# Patient Record
Sex: FEMALE | Race: WHITE | Employment: OTHER | URBAN - METROPOLITAN AREA
[De-identification: names, ages, dates, MRNs, and addresses within clinical notes are randomized per-mention and may not be internally consistent; named-entity substitution may affect disease eponyms.]

---

## 2017-11-03 ENCOUNTER — TRANSFERRED RECORDS (OUTPATIENT)
Dept: HEALTH INFORMATION MANAGEMENT | Facility: CLINIC | Age: 67
End: 2017-11-03

## 2018-05-31 ENCOUNTER — TRANSFERRED RECORDS (OUTPATIENT)
Dept: HEALTH INFORMATION MANAGEMENT | Facility: CLINIC | Age: 68
End: 2018-05-31

## 2018-06-21 DIAGNOSIS — C79.31 METASTASIS TO BRAIN (H): Primary | ICD-10-CM

## 2018-06-25 ENCOUNTER — PRE VISIT (OUTPATIENT)
Dept: RADIATION ONCOLOGY | Facility: CLINIC | Age: 68
End: 2018-06-25

## 2018-06-25 NOTE — TELEPHONE ENCOUNTER
"Date: 2018   Age: 67 year old  Ethnicity:     Sex: female  : 1950   Lives In: [facility] [place]      Diagnosis:Poorly Differentiated Squamous Cell Carcinoma     Prior radiation therapy:   Site Treated: at  Facility: at  Dates: at  Dose: at    Site Treated: at  Facility: at  Dates: at  Dose: at    Prior chemotherapy:   See Below      Pain at time of consult, management plan if yes:  Does pt have a living will:  Is Pt Pregnant:  Does Pt have any implanted cardiac devices:    Doctors to \"cc\":  Dr. Silvia Manning, Dr. Clayton Brothers,     RN time with patient:     Pt was educated on Gamma Knife Procedure         Review Since Diagnosis:    ; left mastectomy, breast cancer, on arimidex 18 months and tamoxifen for 5 years, no recurrence.  Right prophylactic simple mastectomy    2017; cough    10/20/17; PET; found bulky mediastinal adenopathy encasing the left mainstem bronchus and central upper and lower lobe bronchi, bilateral supraclavicular axillary adenopathy, cervical adenopathy , bilateral pulmonary nodules-most prominent left upper lobe 2cm    10/25/17; Brain MRI, negative     11/3/17; left upper lobe lung bronchial lavage and brushings, positive for malignant cells, left upper lobe lung biopsy showed poorly differentiated squamous cell carcinoma, negative for BRAF V600 mutation,PDL1 pos at 5%    1 dose Carboplatin/Taxol    17; Once final pathology showed squamous cell changed to Carboplatin/Gemcitabine    After two cycles severe myelosuppression, bleeding gums and vaginal, performance status down    17; CT excellent response     18; did a third cycle of chemo, stopped after due to performance status     4/3/18; CT Chest/Abd/Pelvis, increase in two nodules left upper lobe, stable left hilum and left perihilar central bronchi, increase nu,erous mediastinal nodes, stable scattered right lung nodules,     18; CT Chest, new left upper lobe mass, increase in known left upper " lobe mass, mediastinal lymph nodes enlarged, nodes along aortic arch larger,     5/31/18; saw Dr. Manning, past month more shortness of breath, more fatigued and more coughing, nausea abd discomfort.  Prednisone now 10 mg daily for RA symptoms.  Plan to start Nivolumab     6/8/18; started Nivolumab     6/15/18; Brain MRI, 0.3 cm lesion superior left frontal motor strip                                     0.3 cm lesion left frontal                                     0.2 cm lesion right frontal centrum semiovale                                     0.2 cm right temporal     6/19/18; pt saw Dr. Brothers, pt headaches, nausea, right arm weakness, noted started on dexamethasone, discussed options. Wants pt to see Dr. Perera regarding Gamma knife    Chief Complaint: at consult

## 2018-06-27 ENCOUNTER — OFFICE VISIT (OUTPATIENT)
Dept: RADIATION ONCOLOGY | Facility: CLINIC | Age: 68
End: 2018-06-27
Attending: RADIOLOGY
Payer: MEDICARE

## 2018-06-27 VITALS
BODY MASS INDEX: 30.55 KG/M2 | HEIGHT: 62 IN | SYSTOLIC BLOOD PRESSURE: 122 MMHG | WEIGHT: 166 LBS | DIASTOLIC BLOOD PRESSURE: 49 MMHG | HEART RATE: 70 BPM | OXYGEN SATURATION: 98 % | RESPIRATION RATE: 16 BRPM

## 2018-06-27 DIAGNOSIS — C79.31 METASTASIS TO BRAIN (H): Primary | ICD-10-CM

## 2018-06-27 PROCEDURE — G0463 HOSPITAL OUTPT CLINIC VISIT: HCPCS | Performed by: RADIOLOGY

## 2018-06-27 RX ORDER — DIPHENOXYLATE HCL/ATROPINE 2.5-.025MG
1 TABLET ORAL 4 TIMES DAILY PRN
COMMUNITY
End: 2019-01-09

## 2018-06-27 ASSESSMENT — ENCOUNTER SYMPTOMS
ABDOMINAL PAIN: 1
NAUSEA: 1
SEIZURES: 0
DEPRESSION: 1
FEVER: 0
SHORTNESS OF BREATH: 1
VOMITING: 0
WEIGHT LOSS: 0
WEAKNESS: 1

## 2018-06-27 NOTE — PROGRESS NOTES
"  HPI    Date: 2018   Age: 67 year old  Ethnicity:     Sex: female  : 1950   Lives In: [facility]      Diagnosis:Poorly Differentiated Squamous Cell Carcinoma     Prior radiation therapy:   none    Prior chemotherapy:   See Below      Pain at time of consult, management plan if yes: general aching, no bad pain  Does pt have a living will: pt does not have  Is Pt Pregnant: age 68  Does Pt have any implanted cardiac devices: no cardiac devices    Doctors to \"cc\":  Dr. Silvia Manning, Dr. Clayton Brothers, Dr. Itzel Zuniga-family    RN time with patient: 55 minutes    Pt was educated on Gamma Knife Procedure yes        Review Since Diagnosis:    ; left mastectomy, breast cancer, on arimidex 18 months and tamoxifen for 5 years, no recurrence.  Right prophylactic simple mastectomy    2017; cough    10/20/17; PET; found bulky mediastinal adenopathy encasing the left mainstem bronchus and central upper and lower lobe bronchi, bilateral supraclavicular axillary adenopathy, cervical adenopathy , bilateral pulmonary nodules-most prominent left upper lobe 2cm    10/25/17; Brain MRI, negative     11/3/17; left upper lobe lung bronchial lavage and brushings, positive for malignant cells, left upper lobe lung biopsy showed poorly differentiated squamous cell carcinoma, negative for BRAF V600 mutation,PDL1 pos at 5%    1 dose Carboplatin/Taxol    17; Once final pathology showed squamous cell changed to Carboplatin/Gemcitabine    After two cycles severe myelosuppression, bleeding gums and vaginal, performance status down    17; CT excellent response     18; did a third cycle of chemo, stopped after due to performance status     4/3/18; CT Chest/Abd/Pelvis, increase in two nodules left upper lobe, stable left hilum and left perihilar central bronchi, increase nu,erous mediastinal nodes, stable scattered right lung nodules,     18; CT Chest, new left upper lobe mass, increase in " known left upper lobe mass, mediastinal lymph nodes enlarged, nodes along aortic arch larger,     5/31/18; saw Dr. Manning, past month more shortness of breath, more fatigued and more coughing, nausea abd discomfort.  Prednisone now 10 mg daily for RA symptoms.  Plan to start Nivoluma.  Pt also noted confusion that was progressing    6/8/18; started Nivolumab     6/15/18; Brain MRI, 0.3 cm lesion superior left frontal motor strip                                     0.3 cm lesion left frontal                                     0.2 cm lesion right frontal centrum semiovale                                     0.2 cm right temporal     6/19/18; pt saw Dr. Brothers, pt headaches, nausea, right arm weakness, noted started on dexamethasone, 4 mg bid discussed options. Wants pt to see Dr. Perera regarding Gamma knife    7/6/18; next Nivolumab, going to do every four weeks to start with    Chief Complaint:  Pt notes headaches a lot better than had been, right arm still weak, right leg not bad, doesn't use any walker at home, short term memory issues, still confused off and on pt notes she knows what she wants to say but doesn't know how to say it, confused with looking at the clock            Review of Systems   Constitutional: Positive for malaise/fatigue. Negative for fever and weight loss.   HENT: Negative for hearing loss.    Eyes:        Pt states she shadows off and on   Respiratory: Positive for shortness of breath.    Cardiovascular: Negative for chest pain and leg swelling.   Gastrointestinal: Positive for abdominal pain and nausea. Negative for vomiting.   Genitourinary:        Pt notes she does not urinate very often   Neurological: Positive for weakness. Negative for seizures.   Psychiatric/Behavioral: Positive for depression.

## 2018-06-27 NOTE — MR AVS SNAPSHOT
After Visit Summary   6/27/2018    Sarah No    MRN: [id number]           Patient Information     Date Of Birth          1950        Visit Information        Provider Department      6/27/2018 9:00 AM Alex Perera MD [facility], [place], Radiation Oncology        Today's Diagnoses     Metastasis to brain (H)    -  1       Follow-ups after your visit        Your next 10 appointments already scheduled     Jul 18, 2018  5:30 AM CDT   GAMMA TREATMENT with Alex Perera MD   [facility]    [address] [address]   [phone]            Jul 18, 2018  7:00 AM CDT   MR BRAIN W CONTRAST with UUMR1   [facility], MRI ([facility], [facility])    [address] [id number] [phone]           Take your medicines as usual, unless your doctor tells you not to. Bring a list of your current medicines to your exam (including vitamins, minerals and over-the-counter drugs).  You may or may not receive intravenous (IV) contrast for this exam pending the discretion of the Radiologist.  You do not need to do anything special to prepare.  The MRI machine uses a strong magnet. Please wear clothes without metal (snaps, zippers). A sweatsuit works well, or we may give you a hospital gown.  Please remove any body piercings and hair extensions before you arrive. You will also remove watches, jewelry, hairpins, wallets, dentures, partial dental plates and hearing aids. You may wear contact lenses, and you may be able to wear your rings. We have a safe place to keep your personal items, but it is safer to leave them at home.  **IMPORTANT** THE INSTRUCTIONS BELOW ARE ONLY FOR THOSE PATIENTS WHO HAVE BEEN PRESCRIBED SEDATION OR GENERAL ANESTHESIA DURING THEIR MRI PROCEDURE:  IF YOUR DOCTOR PRESCRIBED ORAL SEDATION (take medicine to help you relax during your exam):   You must get the  medicine from your doctor (oral medication) before you arrive. Bring the medicine to the exam. Do not take it at home. You ll be told when to take it upon arriving for your exam.   Arrive one hour early. Bring someone who can take you home after the test. Your medicine will make you sleepy. After the exam, you may not drive, take a bus or take a taxi by yourself.  IF YOUR DOCTOR PRESCRIBED IV SEDATION:   Arrive one hour early. Bring someone who can take you home after the test. Your medicine will make you sleepy. After the exam, you may not drive, take a bus or take a taxi by yourself.   No eating 6 hours before your exam. You may have clear liquids up until 4 hours before your exam. (Clear liquids include water, clear tea, black coffee and fruit juice without pulp.)  IF YOUR DOCTOR PRESCRIBED ANESTHESIA (be asleep for your exam):   Arrive 1 1/2 hours early. Bring someone who can take you home after the test. You may not drive, take a bus or take a taxi by yourself.   No eating 8 hours before your exam. You may have clear liquids up until 4 hours before your exam. (Clear liquids include water, clear tea, black coffee and fruit juice without pulp.)   You will spend four to five hours in the recovery room.  Please call the Imaging Department at your exam site with any questions.              Who to contact     Please call your clinic at [phone] to:    Ask questions about your health    Make or cancel appointments    Discuss your medicines    Learn about your test results    Speak to your doctor            Additional Information About Your Visit        GREE Information     GREE gives you secure access to your electronic health record. If you see a primary care provider, you can also send messages to your care team and make appointments. If you have questions, please call your primary care clinic.  If you do not have a primary care provider, please call [phone] and they will assist you.      GREE is  "an electronic gateway that provides easy, online access to your medical records. With [employer], you can request a clinic appointment, read your test results, renew a prescription or communicate with your care team.     To access your existing account, please contact your [facility] or call [phone] for assistance.        Care EveryWhere ID     This is your Care EveryWhere ID. This could be used by other organizations to access your [facility] medical records  [id number]        Your Vitals Were     Pulse Respirations Height Pulse Oximetry BMI (Body Mass Index)       70 16 1.575 m (5' 2\") 98% 30.36 kg/m2        Blood Pressure from Last 3 Encounters:   06/27/18 122/49   12/05/12 111/67   11/20/12 114/68    Weight from Last 3 Encounters:   06/27/18 75.3 kg (166 lb)   12/05/12 55.8 kg (123 lb)   11/20/12 56.1 kg (123 lb 10.9 oz)              We Performed the Following     CBC with platelets differential (only if on Chemo)     Creatinine (if diagnosis of AVM, IV contrast CT required, hx of renal problems, or > age 60)     Electrolyte panel (If > age 60 or hx of being on diuretic, digoxin, or hx of seizures)       Information about OPIOIDS     PRESCRIPTION OPIOIDS: WHAT YOU NEED TO KNOW   We gave you an opioid (narcotic) pain medicine. It is important to manage your pain, but opioids are not always the best choice. You should first try all the other options your care team gave you. Take this medicine for as short a time (and as few doses) as possible.     These medicines have risks:    DO NOT drive when on new or higher doses of pain medicine. These medicines can affect your alertness and reaction times, and you could be arrested for driving under the influence (DUI). If you need to use opioids long-term, talk to your care team about driving.    DO NOT operate heave machinery    DO NOT do any other dangerous activities while taking these medicines.     DO NOT drink any alcohol while taking " these medicines.      If the opioid prescribed includes acetaminophen, DO NOT take with any other medicines that contain acetaminophen. Read all labels carefully. Look for the word  acetaminophen  or  Tylenol.  Ask your pharmacist if you have questions or are unsure.    You can get addicted to pain medicines, especially if you have a history of addiction (chemical, alcohol or substance dependence). Talk to your care team about ways to reduce this risk.    Store your pills in a secure place, locked if possible. We will not replace any lost or stolen medicine. If you don t finish your medicine, please throw away (dispose) as directed by your pharmacist. The [place] Pollution Control Agency has more information about safe disposal: https://www.pca.[facility].[place].us/living-green/managing-unwanted-medications.     All opioids tend to cause constipation. Drink plenty of water and eat foods that have a lot of fiber, such as fruits, vegetables, prune juice, apple juice and high-fiber cereal. Take a laxative (Miralax, milk of magnesia, Colace, Senna) if you don t move your bowels at least every other day.          Primary Care Provider Fax #    Physician No Ref-Primary [phone]       No address on file        Equal Access to Services     CANELO JOHNSON : Hadyasemin Richey, wamendelda isidra, qaybta kaalmada josé miguel, marco atkinson . So [facility] [phone].    ATENCIÓN: Si habla español, tiene a león disposición servicios gratuitos de asistencia lingüística. Llame al [phone].    We comply with applicable federal civil rights laws and [place] laws. We do not discriminate on the basis of race, color, national origin, age, disability, sex, sexual orientation, or gender identity.            Thank you!     Thank you for choosing [facility], RADIATION ONCOLOGY  for your care. Our goal is always to provide you with excellent care. Hearing back from our patients is one way we can continue to  improve our services. Please take a few minutes to complete the written survey that you may receive in the mail after your visit with us. Thank you!             Your Updated Medication List - Protect others around you: Learn how to safely use, store and throw away your medicines at www.disposemymeds.org.          This list is accurate as of 6/27/18 11:59 PM.  Always use your most recent med list.                   Brand Name Dispense Instructions for use Diagnosis    ALPRAZolam 0.5 MG tablet    XANAX     Take 1-3 tablets by mouth daily as needed.        CYCLOBENZAPRINE HCL PO      Take 5 mg by mouth 2 times daily        DEXAMETHASONE PO      Take 4 mg by mouth 2 times daily (with meals)        diphenoxylate-atropine 2.5-0.025 MG per tablet    LOMOTIL     Take 1 tablet by mouth 4 times daily as needed for diarrhea        FOLIC ACID PO      Take 1 mg by mouth daily        GABAPENTIN PO      Take 800 mg by mouth 2 times daily        HYDROXYZINE HCL PO      Take 25 mg by mouth every 6 hours as needed for itching        LISINOPRIL PO      Take 5 mg by mouth daily        LORAZEPAM PO      Take 0.5 mg by mouth every 4 hours as needed for anxiety        METOPROLOL TARTRATE PO      Take 12.5 mg by mouth 2 times daily        mometasone-formoterol 100-5 MCG/ACT oral inhaler    DULERA     Inhale 2 puffs into the lungs 2 times daily        ondansetron 4 MG ODT tab    ZOFRAN-ODT    45 tablet    Take 1 tablet by mouth every 6 hours as needed for nausea.    Biliary anomaly       PILOCARPINE HCL PO      Take 5 mg by mouth 3 times daily        PREDNISONE PO      Take 10 mg by mouth daily        PRILOSEC OTC PO           TRAMADOL HCL PO      Take 50 mg by mouth every 6 hours as needed for moderate to severe pain        TRAZODONE HCL PO      Take 50 mg by mouth At Bedtime        ZOFRAN PO      Take 8 mg by mouth every 6 hours as needed for nausea or vomiting

## 2018-06-27 NOTE — PROGRESS NOTES
Service Date: 06/27/2018      DIAGNOSES:  Metastatic nonsmall cell lung cancer (squamous cell carcinoma) with multiple brain metastases in a patient with a history of breast cancer.      HISTORY OF PRESENT ILLNESS:  The patient is a 67-year-old white female with a 40-pack-year smoking history who was in stable health until 09/2017 when she developed a cough.  Chest x-ray, chest CT and subsequent PET scan 10/2017 confirmed extensive mediastinal adenopathy with bilateral supraclavicular and cervical adenopathy and bilateral pulmonary nodules.  Brain MRI 10/25/2017 showed no brain metastases.  Bronchoscopic biopsy 11/03/2017 confirmed nonsmall cell lung cancer (poorly differentiated squamous cell carcinoma).  She received 3 cycles of chemotherapy, which was poorly tolerated, but did produce a good response to treatment on a CT scan of 12/26/2017.  Followup CT 04/03/2018 showed a mixed response with some lesions in the lungs, stable but others enlarging.        In early May, she became short of breath and a chest CT 05/22/2018 showed a new left upper lung mass and enlargement of the previously seen lung lesions.  In late May, she developed confusion, mild headaches and fatigue.  On 06/08/2018, she started nivolumab immunotherapy on a once a month schedule.  Brain MRI 06/15/2018 showed 4 very small brain metastases (0.3 cm left superior frontal, 0.3 cm mid left frontal, 0.2 cm right medial frontal and 0.2 cm right temporal).  She was started on dexamethasone and referred for consideration of Gamma Knife radiosurgery.        The patient reports she feels worse since the dexamethasone was started and still has side effects from the nivolumab including diarrhea.  She complains of her arthritis, which she feels is worse, but her headaches are slightly better.      PAST MEDICAL HISTORY:   1.  Hypertension.   2.  Depression.   3.  Anxiety.   4.  Gastroesophageal reflux disease.   5.  Tobacco abuse.   6.  Chronic pain.   7.   History of breast cancer, status post bilateral mastectomies in 2010 and treated with Arimidex and tamoxifen for 5 years.   8.  Status post cholecystectomy.     9.  History of rheumatoid arthritis.   10.  History of Sjogren syndrome.   11.  Status post appendectomy.      MEDICATIONS:   1.  Dexamethasone 4 mg b.i.d.  (I will taper this off over the next few weeks).     2.  Alprazolam.   3.  Cyclobenzaprine.   4.  Lomotil.   5.  Folic Acid.   6.  Gabapentin.   7.  Hydroxyzine.   8.  Lisinopril.   9.  Lorazepam.   10.  Metoprolol.   11.  Omeprazole.   12.  Zofran.   13.  Pilocarpine.   14.  Prednisone 10 mg daily for rheumatoid arthritis.   15.  Tramadol.      ALLERGIES:  Zantac.      REVIEW OF SYSTEMS:  All systems were reviewed and found to be otherwise negative.      HABITS:  The patient smoked 1 pack of cigarettes per day for 40 years for a 40-pack-year smoking history.  She quit smoking in 2012.  She seldom drinks alcohol.      SOCIAL HISTORY:  The patient is  and has 4 children.  She lives in [place], [place].  She is accompanied to today's appointment by her , her daughter and her sister.  She is retired from a career as a .      FAMILY HISTORY:  Her mother had lung cancer.  Her father had alcoholism.  Her maternal grandmother had breast cancer and colorectal cancer.  Her brother had prostate and bladder cancer.      PHYSICAL EXAMINATION:   GENERAL:  Well-developed, well-nourished white female in no acute distress, alert and oriented, pleasant and cooperative.   VITAL SIGNS:  Afebrile, vital signs stable.  Height 5 feet 2 inches, weight 166, blood pressure 122/49, heart rate 70, respirations 12, oxygen saturation on room air 98%.  BMI 30.4.   HEENT:  Normal for age.   NECK:  Supple without adenopathy.   LUNGS:  Reveal scattered rhonchi bilaterally.   HEART:  Regular rate and rhythm.  She is status post bilateral mastectomies.   ABDOMEN:  Soft, nontender, without hepatosplenomegaly  or masses.   EXTREMITIES:  Without cyanosis, clubbing or edema.   NEUROLOGIC:  Nonfocal.      IMPRESSION:  Metastatic nonsmall cell lung cancer (squamous cell carcinoma) with 4 small brain metastases.      RECOMMENDATIONS:  The diagnosis, prognosis and therapeutic options were reviewed with the patient and her family.  The brain MRI images were reviewed with them and the tumors were pointed out.  The rationale for Gamma Knife radiosurgery was explained as were the procedures, risks, benefits and potential side effects.  The patient and her family appear to understand and agreed to proceed.  I will also taper the dexamethasone as it does not appear necessary and may hinder the effect of the nivolumab immunotherapy.        The Gamma Knife radiosurgery is scheduled for 2018 at the [facility] at the [facility].  Her next nivolumab scheduled for 2018 with Dr. Manning.      Thank you very much for asking me to see this pleasant woman and to share in her evaluation.      cc:      MD Itzel Dangelo MD D Ross Dickson, MD    [facility] Radiation Therapy   [address] [place] [address]      Tumor Registry         GIL CRUZ MD             D: 2018   T: 2018   MT: [place]      Name:     ANETTE SHAW   MRN:      [id number]        Account:      [id number]   :      1950           Service Date: 2018      Document: [id number]

## 2018-06-27 NOTE — LETTER
"2018       RE: Sarah No  [address] [facility] [address] [place] [address]     Dear Colleague,    Thank you for referring your patient, Sarah No, to the [facility], [place], RADIATION ONCOLOGY. Please see a copy of my visit note below.      HPI    Date: 2018   Age: 67 year old  Ethnicity:     Sex: female  : 1950   L[place] In: [place]      Diagnosis:Poorly Differentiated Squamous Cell Carcinoma     Prior radiation therapy:   none    Prior chemotherapy:   See Below      Pain at time of consult, management plan if yes: general aching, no bad pain  Does pt have a living will: pt does not have  Is Pt Pregnant: age 68  Does Pt have any implanted cardiac devices: no cardiac devices    Doctors to \"cc\":  Dr. Silvia Manning, Dr. Clayton Brothers, Dr. Itzel Zuniga-family    RN time with patient: 55 minutes    Pt was educated on Gamma Knife Procedure yes        Review Since Diagnosis:    ; left mastectomy, breast cancer, on arimidex 18 months and tamoxifen for 5 years, no recurrence.  Right prophylactic simple mastectomy    2017; cough    10/20/17; PET; found bulky mediastinal adenopathy encasing the left mainstem bronchus and central upper and lower lobe bronchi, bilateral supraclavicular axillary adenopathy, cervical adenopathy , bilateral pulmonary nodules-most prominent left upper lobe 2cm    10/25/17; Brain MRI, negative     11/3/17; left upper lobe lung bronchial lavage and brushings, positive for malignant cells, left upper lobe lung biopsy showed poorly differentiated squamous cell carcinoma, negative for BRAF V600 mutation,PDL1 pos at 5%    1 dose Carboplatin/Taxol    17; Once final pathology showed squamous cell changed to Carboplatin/Gemcitabine    After two cycles severe myelosuppression, bleeding gums and vaginal, performance status down    17; CT excellent response     18; did a third cycle of chemo, stopped after due to performance status     4/3/18; " CT Chest/Abd/Pelvis, increase in two nodules left upper lobe, stable left hilum and left perihilar central bronchi, increase nu,erous mediastinal nodes, stable scattered right lung nodules,     5/22/18; CT Chest, new left upper lobe mass, increase in known left upper lobe mass, mediastinal lymph nodes enlarged, nodes along aortic arch larger,     5/31/18; saw Dr. Manning, past month more shortness of breath, more fatigued and more coughing, nausea abd discomfort.  Prednisone now 10 mg daily for RA symptoms.  Plan to start Nivoluma.  Pt also noted confusion that was progressing    6/8/18; started Nivolumab     6/15/18; Brain MRI, 0.3 cm lesion superior left frontal motor strip                                     0.3 cm lesion left frontal                                     0.2 cm lesion right frontal centrum semiovale                                     0.2 cm right temporal     6/19/18; pt saw Dr. Brothers, pt headaches, nausea, right arm weakness, noted started on dexamethasone, 4 mg bid discussed options. Wants pt to see Dr. Perera regarding Gamma knife    7/6/18; next Nivolumab, going to do every four weeks to start with    Chief Complaint:  Pt notes headaches a lot better than had been, right arm still weak, right leg not bad, doesn't use any walker at home, short term memory issues, still confused off and on pt notes she knows what she wants to say but doesn't know how to say it, confused with looking at the clock            Review of Systems   Constitutional: Positive for malaise/fatigue. Negative for fever and weight loss.   HENT: Negative for hearing loss.    Eyes:        Pt states she shadows off and on   Respiratory: Positive for shortness of breath.    Cardiovascular: Negative for chest pain and leg swelling.   Gastrointestinal: Positive for abdominal pain and nausea. Negative for vomiting.   Genitourinary:        Pt notes she does not urinate very often   Neurological: Positive for weakness. Negative  for seizures.   Psychiatric/Behavioral: Positive for depression.                 Service Date: 06/27/2018      DIAGNOSES:  Metastatic nonsmall cell lung cancer (squamous cell carcinoma) with multiple brain metastases in a patient with a history of breast cancer.      HISTORY OF PRESENT ILLNESS:  The patient is a 67-year-old white female with a 40-pack-year smoking history who was in stable health until 09/2017 when she developed a cough.  Chest x-ray, chest CT and subsequent PET scan 10/2017 confirmed extensive mediastinal adenopathy with bilateral supraclavicular and cervical adenopathy and bilateral pulmonary nodules.  Brain MRI 10/25/2017 showed no brain metastases.  Bronchoscopic biopsy 11/03/2017 confirmed nonsmall cell lung cancer (poorly differentiated squamous cell carcinoma).  She received 3 cycles of chemotherapy, which was poorly tolerated, but did produce a good response to treatment on a CT scan of 12/26/2017.  Followup CT 04/03/2018 showed a mixed response with some lesions in the lungs, stable but others enlarging.        In early May, she became short of breath and a chest CT 05/22/2018 showed a new left upper lung mass and enlargement of the previously seen lung lesions.  In late May, she developed confusion, mild headaches and fatigue.  On 06/08/2018, she started nivolumab immunotherapy on a once a month schedule.  Brain MRI 06/15/2018 showed 4 very small brain metastases (0.3 cm left superior frontal, 0.3 cm mid left frontal, 0.2 cm right medial frontal and 0.2 cm right temporal).  She was started on dexamethasone and referred for consideration of Gamma Knife radiosurgery.        The patient reports she feels worse since the dexamethasone was started and still has side effects from the nivolumab including diarrhea.  She complains of her arthritis, which she feels is worse, but her headaches are slightly better.      PAST MEDICAL HISTORY:   1.  Hypertension.   2.  Depression.   3.  Anxiety.   4.   Gastroesophageal reflux disease.   5.  Tobacco abuse.   6.  Chronic pain.   7.  History of breast cancer, status post bilateral mastectomies in 2010 and treated with Arimidex and tamoxifen for 5 years.   8.  Status post cholecystectomy.     9.  History of rheumatoid arthritis.   10.  History of Sjogren syndrome.   11.  Status post appendectomy.      MEDICATIONS:   1.  Dexamethasone 4 mg b.i.d.  (I will taper this off over the next few weeks).     2.  Alprazolam.   3.  Cyclobenzaprine.   4.  Lomotil.   5.  Folic Acid.   6.  Gabapentin.   7.  Hydroxyzine.   8.  Lisinopril.   9.  Lorazepam.   10.  Metoprolol.   11.  Omeprazole.   12.  Zofran.   13.  Pilocarpine.   14.  Prednisone 10 mg daily for rheumatoid arthritis.   15.  Tramadol.      ALLERGIES:  Zantac.      REVIEW OF SYSTEMS:  All systems were reviewed and found to be otherwise negative.      HABITS:  The patient smoked 1 pack of cigarettes per day for 40 years for a 40-pack-year smoking history.  She quit smoking in 2012.  She seldom drinks alcohol.      SOCIAL HISTORY:  The patient is  and has 4 children.  She lives in [place], [place].  She is accompanied to today's appointment by her , her daughter and her sister.  She is retired from a career as a .      FAMILY HISTORY:  Her mother had lung cancer.  Her father had alcoholism.  Her maternal grandmother had breast cancer and colorectal cancer.  Her brother had prostate and bladder cancer.      PHYSICAL EXAMINATION:   GENERAL:  Well-developed, well-nourished white female in no acute distress, alert and oriented, pleasant and cooperative.   VITAL SIGNS:  Afebrile, vital signs stable.  Height 5 feet 2 inches, weight 166, blood pressure 122/49, heart rate 70, respirations 12, oxygen saturation on room air 98%.  BMI 30.4.   HEENT:  Normal for age.   NECK:  Supple without adenopathy.   LUNGS:  Reveal scattered rhonchi bilaterally.   HEART:  Regular rate and rhythm.  She is status post  bilateral mastectomies.   ABDOMEN:  Soft, nontender, without hepatosplenomegaly or masses.   EXTREMITIES:  Without cyanosis, clubbing or edema.   NEUROLOGIC:  Nonfocal.      IMPRESSION:  Metastatic nonsmall cell lung cancer (squamous cell carcinoma) with 4 small brain metastases.      RECOMMENDATIONS:  The diagnosis, prognosis and therapeutic options were reviewed with the patient and her family.  The brain MRI images were reviewed with them and the tumors were pointed out.  The rationale for Gamma Knife radiosurgery was explained as were the procedures, risks, benefits and potential side effects.  The patient and her family appear to understand and agreed to proceed.  I will also taper the dexamethasone as it does not appear necessary and may hinder the effect of the nivolumab immunotherapy.        The Gamma Knife radiosurgery is scheduled for 2018 at the [facility] Kn[facility] at the [facility].  Her next nivolumab scheduled for 2018 with Dr. Manning.      Thank you very much for asking me to see this pleasant woman and to share in her evaluation.         GIL CRUZ MD      cc:      MD Itzel Dangelo MD D Ross Dickson, MD    [facility]   [address] [place] [address]      Tumor Registry                 D: 2018   T: 2018   MT: [place]      Name:     ANETTE SHAW   MRN:      [id number]        Account:      [id number]   :      1950           Service Date: 2018      Document: [id number]

## 2018-07-03 NOTE — PATIENT INSTRUCTIONS
DECADRON SCHEDULE    Patient Name: Sarah No    Decadron (Dexamethasone) is a drug, a type of steroid, which is often used in patients with brain tumors to reduce swelling in the brain.  It must be taken carefully and cannot be stopped abruptly.  The dose must be gradually reduced or tapered.  The table below provides exact instructions regarding how to take it.      Day Date Total Daily Dose (mg) Number of 4 mg tablets to take at      Breakfast Lunch Supper Bedtime    Thru 6/26/18 8 1 0 1 0   W-Sun 6/27/18 thru 7/1/18 6 1 0 1/2 0   M-Sun 7/2/18 thru 7/8/18 4 1 0 0 0   M-Sun 7/9/18 thru 7/15/18 2 1/2 0 0 0   M 7/16/18 0                                                                                          Other Instructions: Opdivo 7/6/18                                Gamma Knife 7/18/18      Dr. CRUZ, GIL MILES

## 2018-07-15 ENCOUNTER — TRANSFERRED RECORDS (OUTPATIENT)
Dept: HEALTH INFORMATION MANAGEMENT | Facility: CLINIC | Age: 68
End: 2018-07-15

## 2018-07-16 ENCOUNTER — TRANSFERRED RECORDS (OUTPATIENT)
Dept: HEALTH INFORMATION MANAGEMENT | Facility: CLINIC | Age: 68
End: 2018-07-16

## 2018-07-16 LAB
BASOPHILS NFR BLD AUTO: NORMAL %
CREAT SERPL-MCNC: 1 MG/DL
EOSINOPHIL NFR BLD AUTO: NORMAL %
ERYTHROCYTE [DISTWIDTH] IN BLOOD BY AUTOMATED COUNT: NORMAL %
GFR SERPL CREATININE-BSD FRML MDRD: 57.4 ML/MIN/1.73M2
HCT VFR BLD AUTO: NORMAL %
HEMOGLOBIN: 13.7 G/DL (ref 11.7–15.7)
LYMPHOCYTES NFR BLD AUTO: NORMAL %
MCH RBC QN AUTO: NORMAL PG
MCHC RBC AUTO-ENTMCNC: NORMAL G/DL
MCV RBC AUTO: NORMAL FL
MONOCYTES NFR BLD AUTO: NORMAL %
NEUTROPHILS NFR BLD AUTO: 87.7 %
PLATELET COUNT - QUEST: 166 10^9/L (ref 150–450)
RBC # BLD AUTO: NORMAL 10^12/L
WBC # BLD AUTO: 11.3 10^9/L

## 2018-07-18 ENCOUNTER — OFFICE VISIT (OUTPATIENT)
Dept: RADIATION ONCOLOGY | Facility: CLINIC | Age: 68
End: 2018-07-18
Attending: RADIOLOGY
Payer: MEDICARE

## 2018-07-18 ENCOUNTER — HOSPITAL ENCOUNTER (OUTPATIENT)
Dept: MRI IMAGING | Facility: CLINIC | Age: 68
Discharge: HOME | End: 2018-07-18
Attending: NEUROLOGICAL SURGERY | Admitting: NEUROLOGICAL SURGERY
Payer: MEDICARE

## 2018-07-18 ENCOUNTER — APPOINTMENT (OUTPATIENT)
Dept: MEDSURG UNIT | Facility: CLINIC | Age: 68
End: 2018-07-18
Attending: RADIOLOGY
Payer: MEDICARE

## 2018-07-18 VITALS
HEART RATE: 81 BPM | RESPIRATION RATE: 18 BRPM | DIASTOLIC BLOOD PRESSURE: 70 MMHG | SYSTOLIC BLOOD PRESSURE: 123 MMHG | TEMPERATURE: 97.8 F | OXYGEN SATURATION: 97 %

## 2018-07-18 VITALS
OXYGEN SATURATION: 94 % | WEIGHT: 160 LBS | BODY MASS INDEX: 29.26 KG/M2 | DIASTOLIC BLOOD PRESSURE: 51 MMHG | SYSTOLIC BLOOD PRESSURE: 131 MMHG | RESPIRATION RATE: 16 BRPM | HEART RATE: 79 BPM

## 2018-07-18 DIAGNOSIS — C79.31 METASTASIS TO BRAIN (H): Primary | ICD-10-CM

## 2018-07-18 DIAGNOSIS — C79.31 METASTASIS TO BRAIN (H): ICD-10-CM

## 2018-07-18 PROCEDURE — 77300 RADIATION THERAPY DOSE PLAN: CPT | Performed by: RADIOLOGY

## 2018-07-18 PROCEDURE — 77371 SRS MULTISOURCE: CPT | Performed by: RADIOLOGY

## 2018-07-18 PROCEDURE — 25000125 ZZHC RX 250: Mod: ZF | Performed by: NEUROLOGICAL SURGERY

## 2018-07-18 PROCEDURE — 77370 RADIATION PHYSICS CONSULT: CPT | Performed by: RADIOLOGY

## 2018-07-18 PROCEDURE — A9585 GADOBUTROL INJECTION: HCPCS | Performed by: NEUROLOGICAL SURGERY

## 2018-07-18 PROCEDURE — 70552 MRI BRAIN STEM W/DYE: CPT

## 2018-07-18 PROCEDURE — 40000172 ZZH STATISTIC PROCEDURE PREP ONLY

## 2018-07-18 PROCEDURE — 77470 SPECIAL RADIATION TREATMENT: CPT | Performed by: RADIOLOGY

## 2018-07-18 PROCEDURE — 25000128 H RX IP 250 OP 636: Mod: ZF | Performed by: NEUROLOGICAL SURGERY

## 2018-07-18 PROCEDURE — A9270 NON-COVERED ITEM OR SERVICE: HCPCS | Mod: GY | Performed by: NEUROLOGICAL SURGERY

## 2018-07-18 PROCEDURE — 77295 3-D RADIOTHERAPY PLAN: CPT | Performed by: RADIOLOGY

## 2018-07-18 PROCEDURE — 25000132 ZZH RX MED GY IP 250 OP 250 PS 637: Mod: GY | Performed by: NEUROLOGICAL SURGERY

## 2018-07-18 PROCEDURE — 25000128 H RX IP 250 OP 636: Performed by: NEUROLOGICAL SURGERY

## 2018-07-18 PROCEDURE — 77334 RADIATION TREATMENT AID(S): CPT | Performed by: RADIOLOGY

## 2018-07-18 RX ORDER — GADOBUTROL 604.72 MG/ML
7.5 INJECTION INTRAVENOUS ONCE
Status: COMPLETED | OUTPATIENT
Start: 2018-07-18 | End: 2018-07-18

## 2018-07-18 RX ORDER — LORAZEPAM 0.5 MG/1
.5-2 TABLET ORAL
Status: COMPLETED | OUTPATIENT
Start: 2018-07-18 | End: 2018-07-18

## 2018-07-18 RX ORDER — LORAZEPAM 0.5 MG/1
.5-1 TABLET ORAL
Status: DISCONTINUED | OUTPATIENT
Start: 2018-07-18 | End: 2018-07-18 | Stop reason: HOSPADM

## 2018-07-18 RX ORDER — HEPARIN SODIUM (PORCINE) LOCK FLUSH IV SOLN 100 UNIT/ML 100 UNIT/ML
500 SOLUTION INTRAVENOUS SEE ADMIN INSTRUCTIONS
Status: COMPLETED | OUTPATIENT
Start: 2018-07-18 | End: 2018-07-18

## 2018-07-18 RX ORDER — LIDOCAINE 40 MG/G
1 CREAM TOPICAL SEE ADMIN INSTRUCTIONS
Status: COMPLETED | OUTPATIENT
Start: 2018-07-18 | End: 2018-07-18

## 2018-07-18 RX ORDER — HYDROCODONE BITARTRATE AND ACETAMINOPHEN 5; 325 MG/1; MG/1
1-2 TABLET ORAL EVERY 6 HOURS PRN
Status: DISCONTINUED | OUTPATIENT
Start: 2018-07-18 | End: 2018-07-18 | Stop reason: HOSPADM

## 2018-07-18 RX ORDER — HEPARIN SODIUM,PORCINE 10 UNIT/ML
5 VIAL (ML) INTRAVENOUS
Status: DISCONTINUED | OUTPATIENT
Start: 2018-07-18 | End: 2018-07-18 | Stop reason: HOSPADM

## 2018-07-18 RX ORDER — DEXAMETHASONE 4 MG/1
4 TABLET ORAL
Status: DISCONTINUED | OUTPATIENT
Start: 2018-07-18 | End: 2018-07-18 | Stop reason: HOSPADM

## 2018-07-18 RX ORDER — ACETAMINOPHEN 325 MG/1
650 TABLET ORAL EVERY 4 HOURS PRN
Status: DISCONTINUED | OUTPATIENT
Start: 2018-07-18 | End: 2018-07-18 | Stop reason: HOSPADM

## 2018-07-18 RX ORDER — ONDANSETRON 4 MG/1
8 TABLET, ORALLY DISINTEGRATING ORAL EVERY 6 HOURS PRN
Status: DISCONTINUED | OUTPATIENT
Start: 2018-07-18 | End: 2018-07-18 | Stop reason: HOSPADM

## 2018-07-18 RX ORDER — ONDANSETRON 2 MG/ML
4 INJECTION INTRAMUSCULAR; INTRAVENOUS
Status: DISCONTINUED | OUTPATIENT
Start: 2018-07-18 | End: 2018-07-18 | Stop reason: HOSPADM

## 2018-07-18 RX ADMIN — SODIUM CHLORIDE, PRESERVATIVE FREE 5 ML: 5 INJECTION INTRAVENOUS at 07:11

## 2018-07-18 RX ADMIN — LORAZEPAM 2 MG: 0.5 TABLET ORAL at 06:15

## 2018-07-18 RX ADMIN — BUPIVACAINE HYDROCHLORIDE 30 ML: 7.5 INJECTION, SOLUTION EPIDURAL; RETROBULBAR at 05:54

## 2018-07-18 RX ADMIN — LIDOCAINE 1 G: 40 CREAM TOPICAL at 05:54

## 2018-07-18 RX ADMIN — SODIUM CHLORIDE, PRESERVATIVE FREE 500 UNITS: 5 INJECTION INTRAVENOUS at 11:20

## 2018-07-18 RX ADMIN — GADOBUTROL 7.5 ML: 604.72 INJECTION INTRAVENOUS at 07:17

## 2018-07-18 NOTE — MR AVS SNAPSHOT
After Visit Summary   7/18/2018    Sarah No    MRN: [id number]           Patient Information     Date Of Birth          1950        Visit Information        Provider Department      7/18/2018 5:30 AM Alex Perera MD [facility], [place], Radiation Oncology        Today's Diagnoses     Metastasis to brain (H)    -  1       Follow-ups after your visit        Future tests that were ordered for you today     Open Standing Orders        Priority Remaining Interval Expires Ordered    If Patient Diabetic: Glucose monitor nursing POCT Routine [id number] PRN 7/19/2018 7/18/2018    Oxygen: Nasal cannula Routine [id number] PRN  7/18/2018            Who to contact     Please call your clinic at [phone] to:    Ask questions about your health    Make or cancel appointments    Discuss your medicines    Learn about your test results    Speak to your doctor            Additional Information About Your Visit        MyChart Information     [employer] gives you secure access to your electronic health record. If you see a primary care provider, you can also send messages to your care team and make appointments. If you have questions, please call your primary care clinic.  If you do not have a primary care provider, please call [phone] and they will assist you.      [employer] is an electronic gateway that provides easy, online access to your medical records. With [employer], you can request a clinic appointment, read your test results, renew a prescription or communicate with your care team.     To access your existing account, please contact your [facility] or call [phone] for assistance.        Care EveryWhere ID     This is your Care EveryWhere ID. This could be used by other organizations to access your [place] medical records  [id number]        Your Vitals Were     Pulse Respirations Pulse Oximetry BMI (Body Mass Index)          90 16 97% 29.26 kg/m2          Blood Pressure from Last 3 Encounters:   07/18/18 123/70   07/18/18 153/59   06/27/18 122/49    Weight from Last 3 Encounters:   07/18/18 72.6 kg (160 lb)   06/27/18 75.3 kg (166 lb)   12/05/12 55.8 kg (123 lb)              Today, you had the following     No orders found for display       Primary Care Provider Fax #    Physician No Ref-Primary [phone]       No address on file        Equal Access to Services     CANELO JOHNSON : Hadii aad ku hadasho Soomaali, waaxda luqadaha, qaybta kaalmada adeegyada, waxay aliasin jonathan atkinson . So [facility] [phone].    ATENCIÓN: Si habla español, tiene a león disposición servicios gratuitos de asistencia lingüística. [facility] [phone].    We comply with applicable federal civil rights laws and [place] laws. We do not discriminate on the basis of race, color, national origin, age, disability, sex, sexual orientation, or gender identity.            Thank you!     Thank you for choosing [facility], [place], RADIATION ONCOLOGY  for your care. Our goal is always to provide you with excellent care. Hearing back from our patients is one way we can continue to improve our services. Please take a few minutes to complete the written survey that you may receive in the mail after your visit with us. Thank you!             Your Updated Medication List - Protect others around you: Learn how to safely use, store and throw away your medicines at www.disposemymeds.org.          This list is accurate as of 7/18/18  9:51 AM.  Always use your most recent med list.                   Brand Name Dispense Instructions for use Diagnosis    CYCLOBENZAPRINE HCL PO      Take 5 mg by mouth 2 times daily        DEXAMETHASONE PO      Take 4 mg by mouth 2 times daily (with meals)        diphenoxylate-atropine 2.5-0.025 MG per tablet    LOMOTIL     Take 1 tablet by mouth 4 times daily as needed for diarrhea        FOLIC ACID PO      Take 1 mg by mouth daily        GABAPENTIN PO      Take 800 mg  by mouth 2 times daily        HYDROXYZINE HCL PO      Take 25 mg by mouth every 6 hours as needed for itching        LISINOPRIL PO      Take 5 mg by mouth daily        LORAZEPAM PO      Take 0.5 mg by mouth every 4 hours as needed for anxiety        METOPROLOL TARTRATE PO      Take 12.5 mg by mouth 2 times daily        mometasone-formoterol 100-5 MCG/ACT oral inhaler    DULERA     Inhale 2 puffs into the lungs 2 times daily        PILOCARPINE HCL PO      Take 5 mg by mouth 3 times daily        PREDNISONE PO      Take 10 mg by mouth daily        PRILOSEC OTC PO           TRAMADOL HCL PO      Take 50 mg by mouth every 6 hours as needed for moderate to severe pain        TRAZODONE HCL PO      Take 50 mg by mouth At Bedtime        ZOFRAN PO      Take 8 mg by mouth every 6 hours as needed for nausea or vomiting

## 2018-07-18 NOTE — PROGRESS NOTES
GAMMA KNIFE RADIOSURGERY TREATMENT SUMMARY  DEPARTMENT OF RADIATION ONCOLOGY    Name: Sarah No                                        : 1950                 Medical Record #: [id number]                       Diagnosis:   Non Small Cell Lung Cancer                                  Date of Treatment: 2018                                       Referring Physicians:  Silvia Manning, FÁTIMA [facility], Tumor Registry     BRIEF CLINICAL HISTORY:                                                                                                 The patient is a 67-year-old white female with a 40-pack-year smoking history who was in stable health until 2017 when she developed a cough.  Chest x-ray, chest CT and subsequent PET scan 10/2017 confirmed extensive mediastinal adenopathy with bilateral supraclavicular and cervical adenopathy and bilateral pulmonary nodules.  Brain MRI 10/25/2017 showed no brain metastases.  Bronchoscopic biopsy 2017 confirmed nonsmall cell lung cancer (poorly differentiated squamous cell carcinoma).  She received 3 cycles of chemotherapy, which was poorly tolerated, but did produce a good response to treatment on a CT scan of 2017.  Followup CT 2018 showed a mixed response with some lesions in the lungs, stable but others enlarging. In early May, she became short of breath and a chest CT 2018 showed a new left upper lung mass and enlargement of the previously seen lung lesions.  In late May, she developed confusion, mild headaches and fatigue.  On 2018, she started nivolumab immunotherapy on a once a month schedule.  Brain MRI 06/15/2018 showed 4 very small brain metastases (0.3 cm left superior frontal, 0.3 cm mid left frontal, 0.2 cm right medial frontal and 0.2 cm right temporal).  She was started on dexamethasone and referred for consideration of Gamma Knife radiosurgery.The patient reports she feels worse since the dexamethasone  was started and still has side effects from the nivolumab including diarrhea.  She complains of her arthritis, which she feels is worse, but her headaches are slightly better.   TECHNICAL SUMMARY        Collimators    Lesion Number Site Energy Minimum Tumor Dose (Gy) Isodose Line (%) Numbers Size (mm) Treatment Volume (cc)   1 Left Mid Frontal Cobalt 60 18 40 1 4 0.13   2 Left Sup Frontal Cobalt 60 18 50 5 4 0.39   3 Right Temporal Scenic 60 18 50 1 4 0.09   4 Rt Mid Frontal Scenic 60 18 50 1 4 0.09     DESCRIPTION OF PROCEDURE:                                                                              On 7/18/2018 the patient was brought to the Gamma Knife suite at [facility].  After sedation and topical anesthetic, the head frame was put on by Dr. Prudence Samano.  The patient was then taken to the department of Radiology where a stereotactic brain MRI was performed.  The patient was admitted to the [facility] where she rested comfortably while treatment planning was completed.  The [employer] Gamma Plan software was used to create a highly conformal dose distribution using the number and size collimators detailed above.  The patient was brought to the Gamma Knife suite.  The treatment was delivered using the Model C Le[employer]ell Gamma Knife without complication.  The head frame was removed and the patient was discharged home in stable condition.  FOLLOW-UP PLANS:                                                                                                        The Gamma Knife Nurse Coordinator will call the patient tomorrow for short-term follow up.  She should have a brain MRI in 2 months and every 3 months thereafter.  The patient will also follow-up with Dr Nigel (s).  I would be happy to see her anytime.    Alex Perera MD, [facility], [place]

## 2018-07-18 NOTE — PROGRESS NOTES
Pt on 2A , s/p HALO placement in MRI.  VSS.  Pt alert and oriented x4.  Pt denies any pain.  Pin sites D/I.  Pt's family at the bedside.

## 2018-07-18 NOTE — LETTER
Date:July 19, 2018      Patient was self referred, no letter generated. Do not send.        [facility] Phys[facility] Health Information

## 2018-07-18 NOTE — PROGRESS NOTES
PREOPERATIVE DIAGNOSIS: Metastatic non-small cell lung cancer with brain metastases    POSTOPERATIVE DIAGNOSIS: same    OPERATIVE PROCEDURES:   1. Application of stereotactic head frame for stereotactic radiosurgery.   2. Gamma knife radiosurgery to 4 intracranial metastases.     SURGEON: Joés Samano MD.     ANESTHESIA: Local.     INDICATION FOR THE PROCEDURE: This patient has metastatic non small cell lung cancer. She presents with 4 brain metastases, and gamma knife radiosurgery was recommended to these lesions.    DESCRIPTION OF PROCEDURE: On the morning of the procedure, the patient was brought to the gamma knife radiosurgery area. A pre-procedure pause was performed to confirm the identity and date of birth of the patient, as well as the procedure site. The scalp was prepped with Betadine and then anesthetized with a combination of Marcaine, lidocaine  and epinephrine. The Pagido G-frame was applied and the pins were fixed to hand tightness. The patient tolerated the application of the frame well. A depth helmet was placed and pin and post measurements were taken.   The patient was taken to the MRI scanner where an MRI with gadolinium contrast was obtained. The patient returned from MRI and all measurements were checked to make sure that the frame had not moved. The lesions were outlined on the planning software and we made a conformal dose outline for each of these lesions. Dr. Perera selected the radiation dose for each lesion.  Measurements were taken,  steps performed and the patient  was then brought into the treatment room. Radiation was given according to the prescription above. The patient was taken out of the unit and out of the treatment room. The stereotactic frame was removed and a dressing was applied. After observation, the patient was discharged. Followup arrangements will be made for the patient.     I attest that I was present for the entirety of the case, including  pre-procedure assessment, stereotactic head frame placement, treatment planning, treatment delivery, as well as frame removal at the end of the treatment.      José Samano MD, PhD  Neurosurgery

## 2018-07-18 NOTE — LETTER
2018       RE: Sarah No  [address]  [place] [address]     Dear Colleague,    Thank you for referring your patient, Sarah No, to the [facility], [place], RADIATION ONCOLOGY. Please see a copy of my visit note below.    GAMMA KNIFE RADIOSURGERY TREATMENT SUMMARY  DEPARTMENT OF RADIATION ONCOLOGY    Name: Sarah No                                        : 1950                 Medical Record #: [id number]                       Diagnosis:   Non Small Cell Lung Cancer                                  Date of Treatment: 2018                                       Referring Physicians:  Silvia Manning, FÁTIMA Jackson, Tumor Registry     BRIEF CLINICAL HISTORY:                                                                                                 The patient is a 67-year-old white female with a 40-pack-year smoking history who was in stable health until 2017 when she developed a cough.  Chest x-ray, chest CT and subsequent PET scan 10/2017 confirmed extensive mediastinal adenopathy with bilateral supraclavicular and cervical adenopathy and bilateral pulmonary nodules.  Brain MRI 10/25/2017 showed no brain metastases.  Bronchoscopic biopsy 2017 confirmed nonsmall cell lung cancer (poorly differentiated squamous cell carcinoma).  She received 3 cycles of chemotherapy, which was poorly tolerated, but did produce a good response to treatment on a CT scan of 2017.  Followup CT 2018 showed a mixed response with some lesions in the lungs, stable but others enlarging. In early May, she became short of breath and a chest CT 2018 showed a new left upper lung mass and enlargement of the previously seen lung lesions.  In late May, she developed confusion, mild headaches and fatigue.  On 2018, she started nivolumab immunotherapy on a once a month schedule.  Brain MRI 06/15/2018 showed 4 very small brain metastases (0.3 cm left  superior frontal, 0.3 cm mid left frontal, 0.2 cm right medial frontal and 0.2 cm right temporal).  She was started on dexamethasone and referred for consideration of Gamma Knife radiosurgery.The patient reports she feels worse since the dexamethasone was started and still has side effects from the nivolumab including diarrhea.  She complains of her arthritis, which she feels is worse, but her headaches are slightly better.   TECHNICAL SUMMARY        Collimators    Lesion Number Site Energy Minimum Tumor Dose (Gy) Isodose Line (%) Numbers Size (mm) Treatment Volume (cc)   1 Left Mid Frontal [place] 60 18 40 1 4 0.13   2 Left Sup Frontal [place] 60 18 50 5 4 0.39   3 Right Temporal [place] 60 18 50 1 4 0.09   4 Rt Mid Frontal [place] 60 18 50 1 4 0.09     DESCRIPTION OF PROCEDURE:                                                                              On 7/18/2018 the patient was brought to the Gamma Knife suite at [facility].  After sedation and topical anesthetic, the head frame was put on by Dr. Prudence Samano.  The patient was then taken to the department of Radiology where a stereotactic brain MRI was performed.  The patient was admitted to the [facility] where she rested comfortably while treatment planning was completed.  The [employer] Gamma Plan software was used to create a highly conformal dose distribution using the number and size collimators detailed above.  The patient was brought to the Gamma Knife suite.  The treatment was delivered using the Model C Leksell Gamma Knife without complication.  The head frame was removed and the patient was discharged home in stable condition.  FOLLOW-UP PLANS:                                                                                                        The Gamma Knife Nurse Coordinator will call the patient tomorrow for short-term follow up.  She should have a brain MRI in 2 months and every 3 months thereafter.  The patient  will also follow-up with Dr Nigel (s).  I would be happy to see her anytime.    Alex Perera MD, [facility], [facility]      PREOPERATIVE DIAGNOSIS: Metastatic non-small cell lung cancer with brain metastases    POSTOPERATIVE DIAGNOSIS: same    OPERATIVE PROCEDURES:   1. Application of stereotactic head frame for stereotactic radiosurgery.   2. Gamma knife radiosurgery to 4 intracranial metastases.     SURGEON: José Samano MD.     ANESTHESIA: Local.     INDICATION FOR THE PROCEDURE: This patient has metastatic non small cell lung cancer. She presents with 4 brain metastases, and gamma knife radiosurgery was recommended to these lesions.    DESCRIPTION OF PROCEDURE: On the morning of the procedure, the patient was brought to the gamma knife radiosurgery area. A pre-procedure pause was performed to confirm the identity and date of birth of the patient, as well as the procedure site. The scalp was prepped with Betadine and then anesthetized with a combination of Marcaine, lidocaine  and epinephrine. The Le[place]ell G-frame was applied and the pins were fixed to hand tightness. The patient tolerated the application of the frame well. A depth helmet was placed and pin and post measurements were taken.   The patient was taken to the MRI scanner where an MRI with gadolinium contrast was obtained. The patient returned from MRI and all measurements were checked to make sure that the frame had not moved. The lesions were outlined on the planning software and we made a conformal dose outline for each of these lesions. Dr. Perera selected the radiation dose for each lesion.  Measurements were taken,  steps performed and the patient  was then brought into the treatment room. Radiation was given according to the prescription above. The patient was taken out of the unit and out of the treatment room. The stereotactic frame was removed and a dressing was applied. After observation, the patient was discharged. Followup  arrangements will be made for the patient.     I attest that I was present for the entirety of the case, including pre-procedure assessment, stereotactic head frame placement, treatment planning, treatment delivery, as well as frame removal at the end of the treatment.      José Samano MD, PhD  Neurosurgery      Again, thank you for allowing me to participate in the care of your patient.      Sincerely,    Alex Perera MD

## 2018-07-19 ENCOUNTER — TRANSFERRED RECORDS (OUTPATIENT)
Dept: HEALTH INFORMATION MANAGEMENT | Facility: CLINIC | Age: 68
End: 2018-07-19

## 2018-07-19 ENCOUNTER — TELEPHONE (OUTPATIENT)
Dept: RADIATION ONCOLOGY | Facility: CLINIC | Age: 68
End: 2018-07-19

## 2018-07-19 NOTE — TELEPHONE ENCOUNTER
A call was placed to Sarah in follow up to her Gamma Knife treatment on 7/18/18.  I spoke to Demetris, Sarah's , as Sarah was in the bathroom.  Demetris said that the head wrap came off during the night but the pin sites look fine and Sarah hasn't said they are sore.  Demetris said Sarah has had no headache or nausea, Sarah isn't wanting to eat a lot but that is nothing new.  Demetris said Sarah slept great yesterday and he felt all was going as expected

## 2018-08-21 ENCOUNTER — TRANSFERRED RECORDS (OUTPATIENT)
Dept: HEALTH INFORMATION MANAGEMENT | Facility: CLINIC | Age: 68
End: 2018-08-21

## 2018-09-12 DIAGNOSIS — C79.31 METASTASIS TO BRAIN (H): Primary | ICD-10-CM

## 2018-09-14 ENCOUNTER — TRANSFERRED RECORDS (OUTPATIENT)
Dept: HEALTH INFORMATION MANAGEMENT | Facility: CLINIC | Age: 68
End: 2018-09-14

## 2018-09-25 ENCOUNTER — PRE VISIT (OUTPATIENT)
Dept: RADIATION ONCOLOGY | Facility: CLINIC | Age: 68
End: 2018-09-25

## 2018-09-25 NOTE — TELEPHONE ENCOUNTER
"Date: 2018   Age: 68 year old  Ethnicity:    Sex: female  : 1950   L[place]       Diagnosis:Poorly Differentiated Squamous Cell Carcinoma      Prior radiation therapy:   Site Treated: 4 brain lesions  [facility]  Dates: 18  Dose: at     Site Treated: at  Facility: at  Dates: at  Dose: at     Prior chemotherapy:   See Below        Pain at time of consult, management plan if yes:  Does pt have a living will:  Is Pt Pregnant:  Does Pt have any implanted cardiac devices:     Doctors to \"cc\":  Dr. Silvia Manning, Dr. Clayton Brothers,      RN time with patient:      Pt was educated on Gamma Knife Procedure            Review Since Diagnosis:    ; left mastectomy, breast cancer, on arimidex 18 months and tamoxifen for 5 years, no recurrence.  Right prophylactic simple mastectomy    2017; cough    10/20/17; PET; found bulky mediastinal adenopathy encasing the left mainstem bronchus and central upper and lower lobe bronchi, bilateral supraclavicular axillary adenopathy, cervical adenopathy , bilateral pulmonary nodules-most prominent left upper lobe 2cm    10/25/17; Brain MRI, negative     11/3/17; left upper lobe lung bronchial lavage and brushings, positive for malignant cells, left upper lobe lung biopsy showed poorly differentiated squamous cell carcinoma, negative for BRAF V600 mutation,PDL1 pos at 5%    1 dose Carboplatin/Taxol    17; Once final pathology showed squamous cell changed to Carboplatin/Gemcitabine    After two cycles severe myelosuppression, bleeding gums and vaginal, performance status down    17; CT excellent response     18; did a third cycle of chemo, stopped after due to performance status     4/3/18; CT Chest/Abd/Pelvis, increase in two nodules left upper lobe, stable left hilum and left perihilar central bronchi, increase nu,erous mediastinal nodes, stable scattered right lung nodules,     18; CT Chest, new left " upper lobe mass, increase in known left upper lobe mass, mediastinal lymph nodes enlarged, nodes along aortic arch larger,     5/31/18; saw Dr. Manning, past month more shortness of breath, more fatigued and more coughing, nausea abd discomfort.  Prednisone now 10 mg daily for RA symptoms.  Plan to start Nivolumab     6/8/18; started Nivolumab     6/15/18; Brain MRI, 0.3 cm lesion superior left frontal motor strip                                     0.3 cm lesion left frontal                                     0.2 cm lesion right frontal centrum semiovale                                     0.2 cm right temporal     6/19/18; pt saw Dr. Brothers, pt headaches, nausea, right arm weakness, noted started on dexamethasone, discussed options. Wants pt to see Dr. Perera regarding Gamma knife    7/18/18; Gamma Knife to 4 brain lesions    8/14/18; CT Chest/Abd/Pelvis, modest progression of thoracic adenopathy and liver mets, asymptomatic, continue Nivolumab    Pt episode of confusion    8/21/18; Brain MRI, increase in paramedian right parietal lesion 0.4 x 0.5 cm    9/14/18; pt saw Nigel Sanchez,     Discussed with Dr. Perera felt new met, see for GAmma Knife  Chief Complaint: at consult

## 2018-09-26 ENCOUNTER — OFFICE VISIT (OUTPATIENT)
Dept: RADIATION ONCOLOGY | Facility: CLINIC | Age: 68
End: 2018-09-26
Attending: RADIOLOGY
Payer: MEDICARE

## 2018-09-26 VITALS
HEART RATE: 78 BPM | OXYGEN SATURATION: 93 % | WEIGHT: 160 LBS | DIASTOLIC BLOOD PRESSURE: 39 MMHG | RESPIRATION RATE: 16 BRPM | SYSTOLIC BLOOD PRESSURE: 111 MMHG | HEIGHT: 62 IN | BODY MASS INDEX: 29.44 KG/M2

## 2018-09-26 DIAGNOSIS — C79.31 METASTASIS TO BRAIN (H): Primary | ICD-10-CM

## 2018-09-26 PROCEDURE — G0463 HOSPITAL OUTPT CLINIC VISIT: HCPCS | Performed by: RADIOLOGY

## 2018-09-26 ASSESSMENT — ENCOUNTER SYMPTOMS
BLURRED VISION: 1
HEADACHES: 1
WEIGHT LOSS: 0
FEVER: 0
BLOOD IN STOOL: 0
CONSTIPATION: 0
DIARRHEA: 0
NAUSEA: 0
SHORTNESS OF BREATH: 1
DEPRESSION: 1
VOMITING: 0

## 2018-09-26 NOTE — MR AVS SNAPSHOT
After Visit Summary   9/26/2018    Sarah No    MRN: [id number]           Patient Information     Date Of Birth          1950        Visit Information        Provider Department      9/26/2018 12:00 PM Alex Perera MD [facility], [place], Radiation Oncology        Today's Diagnoses     Metastasis to brain (H)    -  1       Follow-ups after your visit        Your next 10 appointments already scheduled     Oct 10, 2018  5:30 AM CDT   GAMMA TREATMENT with Alex Perera MD   [facility] ([facility])    [address] [address]   [phone]            Oct 10, 2018  7:30 AM CDT   MR BRAIN W CONTRAST with UUMR1   [facility] [place], MRI ([facility], [facility])    [address] [address] [phone]           How do I prepare for my exam? (Food and drink instructions) **If you will be receiving sedation or general anesthesia, please see special notes below.**  How do I prepare for my exam? (Other instructions) Take your medicines as usual, unless your doctor tells you not to. You may or may not receive intravenous (IV) contrast for this exam pending the discretion of the Radiologist.  You do not need to do anything special to prepare.  **If you will be receiving sedation or general anesthesia, please see special notes below.**  What should I wear: The MRI machine uses a strong magnet. Please wear clothes without metal (snaps, zippers). A sweatsuit works well, or we may give you a hospital gown. Please remove any body piercings and hair extensions before you arrive. You will also remove watches, jewelry, hairpins, wallets, dentures, partial dental plates and hearing aids. You may wear contact lenses, and you may be able to wear your rings. We have a safe place to keep your personal items, but it is safer to leave them at home.  How long does the exam take: Most tests take 30 to 60  minutes.  HOWEVER, IF YOUR DOCTOR PRESCRIBES ANESTHESIA please plan on spending four to five hours in the recovery room.  What should I bring:  Bring a list of your current medicines to your exam (including vitamins, minerals and over-the-counter drugs).  Do I need a :  **If you will be receiving sedation or general anesthesia, please see special notes below.**  What should I do after the exam: No Restrictions, You may resume normal activities.  What is this test: MRI (magnetic resonance imaging) uses a strong magnet and radio waves to look inside the body. An MRA (magnetic resonance angiogram) does the same thing, but it lets us look at your blood vessels. A computer turns the radio waves into pictures showing cross sections of the body, much like slices of bread. This helps us see any problems more clearly. You may receive fluid (called  contrast ) before or during your scan. The fluid helps us see the pictures better. We give the fluid through an IV (small needle in your arm).  Who should I call with questions:  Please call the Imaging Department at your exam site with any questions. Directions, parking instructions, and other information is available on our website, [email or web address].  How do I prepare if I m having sedation or anesthesia? **IMPORTANT** THE INSTRUCTIONS BELOW ARE ONLY FOR THOSE PATIENTS WHO HAVE BEEN TOLD THEY WILL RECEIVE SEDATION OR GENERAL ANESTHESIA DURING THEIR MRI PROCEDURE:  IF YOU WILL RECEIVE SEDATION (take medicine to help you relax during your exam): You must get the medicine from your doctor before you arrive. Bring the medicine to the exam. Do not take it at home. Arrive one hour early. Bring someone who can take you home after the test. Your medicine will make you sleepy. After the exam, you may not drive, take a bus or take a taxi by yourself. No eating 8 hours before your exam. You may have clear liquids up until 4 hours before your exam. (Clear liquids include water,  "clear tea, black coffee and fruit juice without pulp.)  IF YOU WILL RECEIVE ANESTHESIA (be asleep for your exam): Arrive 1 1/2 hours early. Bring someone who can take you home after the test. You may not drive, take a bus or take a taxi by yourself. No eating 8 hours before your exam. You may have clear liquids up until 4 hours before your exam. (Clear liquids include water, clear tea, black coffee and fruit juice without pulp.)              Who to contact     Please call your clinic at [phone] to:    Ask questions about your health    Make or cancel appointments    Discuss your medicines    Learn about your test results    Speak to your doctor            Additional Information About Your Visit        [employer] Information     [employer] gives you secure access to your electronic health record. If you see a primary care provider, you can also send messages to your care team and make appointments. If you have questions, please call your primary care clinic.  If you do not have a primary care provider, please call [phone] and they will assist you.      [employer] is an electronic gateway that provides easy, online access to your medical records. With [employer], you can request a clinic appointment, read your test results, renew a prescription or communicate with your care team.     To access your existing account, please contact your [facility] or call [phone] for assistance.        Care EveryWhere ID     This is your Care EveryWhere ID. This could be used by other organizations to access your [place] medical records  [id number]        Your Vitals Were     Pulse Respirations Height Pulse Oximetry BMI (Body Mass Index)       78 16 1.575 m (5' 2\") 93% 29.26 kg/m2        Blood Pressure from Last 3 Encounters:   09/26/18 (!) 111/39   07/18/18 123/70   07/18/18 131/51    Weight from Last 3 Encounters:   09/26/18 72.6 kg (160 lb)   07/18/18 72.6 kg (160 lb)   06/27/18 75.3 kg (166 lb) "              We Performed the Following     CBC with platelets differential (only if on Chemo)     Creatinine (if diagnosis of AVM, IV contrast CT required, hx of renal problems, or > age 60)     Electrolyte panel (If > age 60 or hx of being on diuretic, digoxin, or hx of seizures)        Primary Care Provider Fax #    Physician No Ref-Primary [phone]       No address on file        Equal Access to Services     : Hadii mikey begum hadcrystalo Soevaali, waaxda luqadaha, qaybta kaalmada michellechilangogela, marco armando karolineyvan martinez mirthagray villegasfabianyvan . So [facility] [phone].    ATENCIÓN: Si habla español, tiene a león disposición servicios gratuitos de asistencia lingüística. Marita al [phone].    We comply with applicable federal civil rights laws and [place] laws. We do not discriminate on the basis of race, color, national origin, age, disability, sex, sexual orientation, or gender identity.            Thank you!     Thank you for choosing [facility], [place], RADIATION ONCOLOGY  for your care. Our goal is always to provide you with excellent care. Hearing back from our patients is one way we can continue to improve our services. Please take a few minutes to complete the written survey that you may receive in the mail after your visit with us. Thank you!             Your Updated Medication List - Protect others around you: Learn how to safely use, store and throw away your medicines at www.disposemymeds.org.          This list is accurate as of 9/26/18 11:59 PM.  Always use your most recent med list.                   Brand Name Dispense Instructions for use Diagnosis    CYCLOBENZAPRINE HCL PO      Take 5 mg by mouth 2 times daily        diphenoxylate-atropine 2.5-0.025 MG per tablet    LOMOTIL     Take 1 tablet by mouth 4 times daily as needed for diarrhea        FOLIC ACID PO      Take 1 mg by mouth daily        GABAPENTIN PO      Take 800 mg by mouth 2 times daily        HYDROXYZINE HCL PO      Take 25 mg by mouth  every 6 hours as needed for itching        KEPPRA PO      Take 500 mg by mouth At Bedtime        LISINOPRIL PO      Take 5 mg by mouth daily        LORAZEPAM PO      Take 0.5 mg by mouth every 4 hours as needed for anxiety        METOPROLOL TARTRATE PO      Take 12.5 mg by mouth 2 times daily        mometasone-formoterol 100-5 MCG/ACT oral inhaler    DULERA     Inhale 2 puffs into the lungs 2 times daily        PILOCARPINE HCL PO      Take 5 mg by mouth 3 times daily        PREDNISONE PO      Take 30 mg by mouth daily        PRILOSEC OTC PO           TRAMADOL HCL PO      Take 50 mg by mouth every 6 hours as needed for moderate to severe pain        ZOFRAN PO      Take 8 mg by mouth every 6 hours as needed for nausea or vomiting

## 2018-09-26 NOTE — PROGRESS NOTES
"  HPI    Date: 2018   Age: 68 year old  Ethnicity:    Sex: female  : 1950   L[place] In: [place]       Diagnosis:Poorly Differentiated Squamous Cell Carcinoma      Prior radiation therapy:   Site Treated: 4 brain lesions  Facility: [facility] Gamma Knife  Dates: 18  Dose: at          Prior chemotherapy:   See Below        Pain at time of consult, management plan if yes: \"dull headache\"  Does pt have a living will: pt does not have  Is Pt Pregnant: Age 68  Does Pt have any implanted cardiac devices: pt has no implanted cardiac devices     Doctors to \"cc\":  Dr. Silvia Manning, Dr. Clayton Brothers, Dr. Itzel Zuniga-primary     RN time with patient: 50 minutes     Pt was educated on Gamma Knife Procedure ; has had before and reviewed again           Review Since Diagnosis:    ; left mastectomy, breast cancer, on arimidex 18 months and tamoxifen for 5 years, no recurrence.  Right prophylactic simple mastectomy    2017; cough    10/20/17; PET; found bulky mediastinal adenopathy encasing the left mainstem bronchus and central upper and lower lobe bronchi, bilateral supraclavicular axillary adenopathy, cervical adenopathy , bilateral pulmonary nodules-most prominent left upper lobe 2cm    10/25/17; Brain MRI, negative     11/3/17; left upper lobe lung bronchial lavage and brushings, positive for malignant cells, left upper lobe lung biopsy showed poorly differentiated squamous cell carcinoma, negative for BRAF V600 mutation,PDL1 pos at 5%    1 dose Carboplatin/Taxol    17; Once final pathology showed squamous cell changed to Carboplatin/Gemcitabine    After two cycles severe myelosuppression, bleeding gums and vaginal, performance status down    17; CT excellent response     18; did a third cycle of chemo, stopped after due to performance status     4/3/18; CT Chest/Abd/Pelvis, increase in two nodules left upper lobe, stable left hilum and left perihilar central bronchi, " increase nu,erous mediastinal nodes, stable scattered right lung nodules,     5/22/18; CT Chest, new left upper lobe mass, increase in known left upper lobe mass, mediastinal lymph nodes enlarged, nodes along aortic arch larger,     5/31/18; saw Dr. Manning, past month more shortness of breath, more fatigued and more coughing, nausea abd discomfort.  Prednisone now 10 mg daily for RA symptoms.  Plan to start Nivolumab     6/8/18; started Nivolumab     6/15/18; Brain MRI, 0.3 cm lesion superior left frontal motor strip                                     0.3 cm lesion left frontal                                     0.2 cm lesion right frontal centrum semiovale                                     0.2 cm right temporal     6/19/18; pt saw Dr. Brothers, pt headaches, nausea, right arm weakness, noted started on dexamethasone, discussed options. Wants pt to see Dr. Perera regarding Gamma knife    7/18/18; Gamma Knife to 4 brain lesions    8/14/18; CT Chest/Abd/Pelvis, modest progression of thoracic adenopathy and liver mets, asymptomatic, continue Nivolumab    8/21/18; period of confusion in sense of not being able to tell time on clock, a bit wobbly    8/21/18; Brain MRI, increase in paramedian right parietal lesion 0.4 x 0.5 cm    Pt had a couple of episodes of almost falling back, arms shaking a bit, cool and clammy so started Keppra 500 mg po bid    9/14/18; pt saw Nigel Sanchez, pt     Pt was so sleepy from the keppra, and still having some of the weak episodes so changed to keppra 500 mg daily and increased the prednisone to 30 mg vs 10mg    Discussed with Dr. Perera felt new met, see for GAmma Knife    Pt unsure when last Nivolumab, but daughter states that continue Nivolumab a few more times, another scan end Sept early Oct and then go from there if change therapy    9/25/18; Brain MRI done as Dr. Manning pushed that up a bit as after last visit pt episode of weakness      Chief Complaint: episodes of getting  clammy and a bit shaky, headaches pt not sure if every day-pt has had that for years, but maybe a bit more frequent, pt notes neck sore.  Pt states sees shadows and people,               Review of Systems   Constitutional: Positive for malaise/fatigue. Negative for fever and weight loss.   Eyes: Positive for blurred vision.   Respiratory: Positive for shortness of breath.    Cardiovascular: Negative for leg swelling.   Gastrointestinal: Negative for blood in stool, constipation, diarrhea, nausea and vomiting.   Musculoskeletal:        Hx of arthritis on prednisone   Neurological: Positive for headaches.   Psychiatric/Behavioral: Positive for depression.

## 2018-09-26 NOTE — PROGRESS NOTES
Service Date: 09/26/2018      REFERRING PHYSICIANS:  Silvia Manning MD, Itzel Zuniga MD, LOLITA Brothers MD and Tumor Registry      DIAGNOSIS:  Metastatic nonsmall cell lung cancer (squamous cell carcinoma with a new brain metastasis in a patient who also has a history of breast cancer.      HISTORY OF PRESENT ILLNESS:  The patient is a 68-year-old white female with a 40-pack-year smoking history who was in stable health until 09/2017 when she developed a cough.  Chest x-ray, CT and subsequent PET scan in 10/2017 confirmed extensive mediastinal adenopathy and bilateral supraclavicular and cervical adenopathy as well as bilateral pulmonary nodules.  Brain MRI 10/25/2017 showed no brain metastases.  Bronchoscopic biopsy, 11/03/2017, confirmed nonsmall cell lung cancer (poorly differentiated squamous cell carcinoma).  She received 3 cycles of chemotherapy which was poorly tolerated, but did produce a good response by CT scan, 12/26/2017.  Followup CT, 04/03/2018, showed a mixed response with some lesions in the lung, stable but others enlarging.  On 05/22/2018  chest CT showed a new left upper lung mass and enlargement of the other lung nodules.  In late 05/2018 she developed headaches, confusion and fatigue.  She was started on nivolumab immunotherapy on 06/08/2018 and that is ongoing.  Brain MRI, 06/15/2018, showed 4 brain metastases.  These were treated with gamma knife radiosurgery on 07/18/2018.  CT scan of the chest, abdomen and pelvis, 08/14/2018, showed progression of the disease in the lung.  She has been having presyncopal episodes for more than a year, but on 08/21/2018 she had a presyncopal episode associated with confusion.  Brain MRI, 08/21/2018, showed a 0.5 cm right posterior parietal paramedian metastasis which was new.  She was started on Keppra which made her somnolent, so the Keppra dose was reduced from 500 mg b.i.d. to daily and prednisone was increased from 10 mg daily to 30 mg daily.  She was  referred for consideration of gamma knife radiosurgery for the new right posterior parietal paramedian brain metastasis.  Dr. Herrera repeated a brain MRI 09/25/2018 which showed slight growth of that lesion and an excellent response of the other lesions to the prior gamma knife.  The patient's chief complaint is fatigue and neck pain from hanging her head most of the day in addition to these intermittent presyncopal episodes.  She does report that her joint pain from rheumatoid arthritis is better on the increased dose of prednisone.      PAST MEDICAL HISTORY:  Tobacco abuse, hypertension, depression, anxiety, gastroesophageal reflux, chronic pain from rheumatoid arthritis, rheumatoid arthritis, Sjogren's disease, history of transient ischemic attack, status post cholecystectomy, history of breast cancer for when she was treated with bilateral mastectomies in 2010 and then treated with Arimidex and tamoxifen for 5 years.      MEDICATIONS:   1.  Prednisone 30 mg p.o. daily.   2.  Keppra 500 mg p.o. daily.   3.  Cyclobenzaprine.   4.  Lomotil.   5.  Folic acid.   6.  Gabapentin.   7.  Hydroxyzine.   8.  Lisinopril.   9.  Lorazepam.   10.  Metoprolol.   11.  Dulera.   12.  Omeprazole.   13.  Ondansetron.   14.  Pilocarpine.   15.  Tramadol.      ALLERGIES:  Zantac causes nausea and vomiting.      REVIEW OF SYSTEMS:  All systems were reviewed and found to be otherwise negative.      SOCIAL HISTORY:  The patient is  and has 4 children.  She lives in [place], [place].  She is accompanied to today's appointment by her daughter, Harini.  She is retired from a career as a .      HABITS:  The patient smoked half to 1 pack of cigarettes per day for 40 years but quit in 2012.  She will have 1 alcoholic beverage per week.      FAMILY HISTORY:  Her mother had lung cancer and her maternal grandmother had both breast and colorectal cancer.  Her brother had prostate cancer.      PHYSICAL EXAMINATION:    GENERAL:  Elderly white female in no acute distress, alert and oriented, pleasant and cooperative.   VITAL SIGNS:  Afebrile.  Vital signs stable.  Height 5 feet 2 inches.  Weight 160.  Blood pressure 111/39 (abnormal).  Heart rate 78.  Respirations 12.  Oxygen saturation on room air 93%.  BMI 29.3.   HEENT:  Normal for age.   NECK:  Supple without adenopathy but her preferred posture is to have her neck flexed such that her chin almost touches her chest.   LUNGS:  Reveal scattered rhonchi.   HEART:  Regular rate and rhythm.  She is status post bilateral mastectomies.   ABDOMEN:  Obese, nontender.   EXTREMITIES:  Without cyanosis or clubbing.  She does have mild pedal edema.   NEUROLOGIC:  Reveals generalized weakness but no focal weakness.  She requires assistance to ambulate.      IMPRESSION:  Metastatic nonsmall cell lung cancer with a new brain metastasis (right posterior parietal paramedian) in a patient status post gamma knife radiosurgery for 4 other brain metastases in the past which have responded well.  She also has a history of breast cancer, rheumatoid arthritis and presyncopal spells as detailed above.      RECOMMENDATIONS:  The diagnosis, prognosis and therapeutic options were reviewed with the patient and her daughter.  The brain MRI images were reviewed with them and the tumor was pointed out.  I do not recommend whole brain radiation therapy.  I do recommend gamma knife radiosurgery to the new brain metastasis.  The rationale for this approach as well as the procedures, risks, benefits and potential side effects were explained.  The patient and her daughter appear to understand and agree to proceed.  The procedure is scheduled for 10/10/2018 at the Gamma Knife Center at the [facility].      Thank you very much for asking me to see this pleasant woman and to share in her evaluation.      Alex Perera MD      cc:   MD Itzel Dangelo MD D Ross Dickson, MD    [facility] Radiation Therapy   [address] [place] [address]      Tumor Registry         GIL CRUZ MD             D: 2018   T: 2018   MT: nh      Name:     ANETTE SHAW   MRN:      [id number]        Account:      [id number]   :      1950           Service Date: 2018      Document: [id number]

## 2018-09-26 NOTE — LETTER
"2018       RE: Sarah No  [id number] [address] [place] [id number]     Dear Colleague,    Thank you for referring your patient, Sarah No, to the [facility], [place], RADIATION ONCOLOGY. Please see a copy of my visit note below.      HPI    Date: 2018   Age: 68 year old  Ethnicity:    Sex: female  : 1950   Lives In: [place]       Diagnosis:Poorly Differentiated Squamous Cell Carcinoma      Prior radiation therapy:   Site Treated: 4 brain lesions  [facility]ility[facility]  Dates: 18  Dose: at          Prior chemotherapy:   See Below        Pain at time of consult, management plan if yes: \"dull headache\"  Does pt have a living will: pt does not have  Is Pt Pregnant: Age 68  Does Pt have any implanted cardiac devices: pt has no implanted cardiac devices     Doctors to \"cc\":  Dr. Silvia Manning, Dr. Clayton Brothers, Dr. Itzel Zuniga-primary     RN time with patient: 50 minutes     Pt was educated on Gamma Knife Procedure ; has had before and reviewed again           Review Since Diagnosis:    ; left mastectomy, breast cancer, on arimidex 18 months and tamoxifen for 5 years, no recurrence.  Right prophylactic simple mastectomy    2017; cough    10/20/17; PET; found bulky mediastinal adenopathy encasing the left mainstem bronchus and central upper and lower lobe bronchi, bilateral supraclavicular axillary adenopathy, cervical adenopathy , bilateral pulmonary nodules-most prominent left upper lobe 2cm    10/25/17; Brain MRI, negative     11/3/17; left upper lobe lung bronchial lavage and brushings, positive for malignant cells, left upper lobe lung biopsy showed poorly differentiated squamous cell carcinoma, negative for BRAF V600 mutation,PDL1 pos at 5%    1 dose Carboplatin/Taxol    17; Once final pathology showed squamous cell changed to Carboplatin/Gemcitabine    After two cycles severe myelosuppression, bleeding gums and vaginal, performance status " down    12/26/17; CT excellent response     1/19/18; did a third cycle of chemo, stopped after due to performance status     4/3/18; CT Chest/Abd/Pelvis, increase in two nodules left upper lobe, stable left hilum and left perihilar central bronchi, increase nu,erous mediastinal nodes, stable scattered right lung nodules,     5/22/18; CT Chest, new left upper lobe mass, increase in known left upper lobe mass, mediastinal lymph nodes enlarged, nodes along aortic arch larger,     5/31/18; saw Dr. Manning, past month more shortness of breath, more fatigued and more coughing, nausea abd discomfort.  Prednisone now 10 mg daily for RA symptoms.  Plan to start Nivolumab     6/8/18; started Nivolumab     6/15/18; Brain MRI, 0.3 cm lesion superior left frontal motor strip                                     0.3 cm lesion left frontal                                     0.2 cm lesion right frontal centrum semiovale                                     0.2 cm right temporal     6/19/18; pt saw Dr. Brothers, pt headaches, nausea, right arm weakness, noted started on dexamethasone, discussed options. Wants pt to see Dr. Perera regarding Gamma knife    7/18/18; Gamma Knife to 4 brain lesions    8/14/18; CT Chest/Abd/Pelvis, modest progression of thoracic adenopathy and liver mets, asymptomatic, continue Nivolumab    8/21/18; period of confusion in sense of not being able to tell time on clock, a bit wobbly    8/21/18; Brain MRI, increase in paramedian right parietal lesion 0.4 x 0.5 cm    Pt had a couple of episodes of almost falling back, arms shaking a bit, cool and clammy so started Keppra 500 mg po bid    9/14/18; pt saw Nigel Sanchez, pt     Pt was so sleepy from the keppra, and still having some of the weak episodes so changed to keppra 500 mg daily and increased the prednisone to 30 mg vs 10mg    Discussed with Dr. Perera felt new met, see for GAmma Knife    Pt unsure when last Nivolumab, but daughter states that continue  Nivolumab a few more times, another scan end Sept early Oct and then go from there if change therapy    9/25/18; Brain MRI done as Dr. Manning pushed that up a bit as after last visit pt episode of weakness      Chief Complaint: episodes of getting clammy and a bit shaky, headaches pt not sure if every day-pt has had that for years, but maybe a bit more frequent, pt notes neck sore.  Pt states sees shadows and people,               Review of Systems   Constitutional: Positive for malaise/fatigue. Negative for fever and weight loss.   Eyes: Positive for blurred vision.   Respiratory: Positive for shortness of breath.    Cardiovascular: Negative for leg swelling.   Gastrointestinal: Negative for blood in stool, constipation, diarrhea, nausea and vomiting.   Musculoskeletal:        Hx of arthritis on prednisone   Neurological: Positive for headaches.   Psychiatric/Behavioral: Positive for depression.                 Service Date: 09/26/2018      REFERRING PHYSICIANS:  Silvia Manning MD, Itzel Zuniga MD, LOLITA Brothers MD and Tumor Registry      DIAGNOSIS:  Metastatic nonsmall cell lung cancer (squamous cell carcinoma with a new brain metastasis in a patient who also has a history of breast cancer.      HISTORY OF PRESENT ILLNESS:  The patient is a 68-year-old white female with a 40-pack-year smoking history who was in stable health until 09/2017 when she developed a cough.  Chest x-ray, CT and subsequent PET scan in 10/2017 confirmed extensive mediastinal adenopathy and bilateral supraclavicular and cervical adenopathy as well as bilateral pulmonary nodules.  Brain MRI 10/25/2017 showed no brain metastases.  Bronchoscopic biopsy, 11/03/2017, confirmed nonsmall cell lung cancer (poorly differentiated squamous cell carcinoma).  She received 3 cycles of chemotherapy which was poorly tolerated, but did produce a good response by CT scan, 12/26/2017.  Followup CT, 04/03/2018, showed a mixed response with some lesions in  the lung, stable but others enlarging.  On 05/22/2018  chest CT showed a new left upper lung mass and enlargement of the other lung nodules.  In late 05/2018 she developed headaches, confusion and fatigue.  She was started on nivolumab immunotherapy on 06/08/2018 and that is ongoing.  Brain MRI, 06/15/2018, showed 4 brain metastases.  These were treated with gamma knife radiosurgery on 07/18/2018.  CT scan of the chest, abdomen and pelvis, 08/14/2018, showed progression of the disease in the lung.  She has been having presyncopal episodes for more than a year, but on 08/21/2018 she had a presyncopal episode associated with confusion.  Brain MRI, 08/21/2018, showed a 0.5 cm right posterior parietal paramedian metastasis which was new.  She was started on Keppra which made her somnolent, so the Keppra dose was reduced from 500 mg b.i.d. to daily and prednisone was increased from 10 mg daily to 30 mg daily.  She was referred for consideration of gamma knife radiosurgery for the new right posterior parietal paramedian brain metastasis.  Dr. Herrera repeated a brain MRI 09/25/2018 which showed slight growth of that lesion and an excellent response of the other lesions to the prior gamma knife.  The patient's chief complaint is fatigue and neck pain from hanging her head most of the day in addition to these intermittent presyncopal episodes.  She does report that her joint pain from rheumatoid arthritis is better on the increased dose of prednisone.      PAST MEDICAL HISTORY:  Tobacco abuse, hypertension, depression, anxiety, gastroesophageal reflux, chronic pain from rheumatoid arthritis, rheumatoid arthritis, Sjogren's disease, history of transient ischemic attack, status post cholecystectomy, history of breast cancer for when she was treated with bilateral mastectomies in 2010 and then treated with Arimidex and tamoxifen for 5 years.      MEDICATIONS:   1.  Prednisone 30 mg p.o. daily.   2.  Keppra 500 mg p.o. daily.    3.  Cyclobenzaprine.   4.  Lomotil.   5.  Folic acid.   6.  Gabapentin.   7.  Hydroxyzine.   8.  Lisinopril.   9.  Lorazepam.   10.  Metoprolol.   11.  Dulera.   12.  Omeprazole.   13.  Ondansetron.   14.  Pilocarpine.   15.  Tramadol.      ALLERGIES:  Zantac causes nausea and vomiting.      REVIEW OF SYSTEMS:  All systems were reviewed and found to be otherwise negative.      SOCIAL HISTORY:  The patient is  and has 4 children.  She lives in [place], [place].  She is accompanied to today's appointment by her daughter, Harini.  She is retired from a career as a .      HABITS:  The patient smoked half to 1 pack of cigarettes per day for 40 years but quit in 2012.  She will have 1 alcoholic beverage per week.      FAMILY HISTORY:  Her mother had lung cancer and her maternal grandmother had both breast and colorectal cancer.  Her brother had prostate cancer.      PHYSICAL EXAMINATION:   GENERAL:  Elderly white female in no acute distress, alert and oriented, pleasant and cooperative.   VITAL SIGNS:  Afebrile.  Vital signs stable.  Height 5 feet 2 inches.  Weight 160.  Blood pressure 111/39 (abnormal).  Heart rate 78.  Respirations 12.  Oxygen saturation on room air 93%.  BMI 29.3.   HEENT:  Normal for age.   NECK:  Supple without adenopathy but her preferred posture is to have her neck flexed such that her chin almost touches her chest.   LUNGS:  Reveal scattered rhonchi.   HEART:  Regular rate and rhythm.  She is status post bilateral mastectomies.   ABDOMEN:  Obese, nontender.   EXTREMITIES:  Without cyanosis or clubbing.  She does have mild pedal edema.   NEUROLOGIC:  Reveals generalized weakness but no focal weakness.  She requires assistance to ambulate.      IMPRESSION:  Metastatic nonsmall cell lung cancer with a new brain metastasis (right posterior parietal paramedian) in a patient status post gamma knife radiosurgery for 4 other brain metastases in the past which have responded well.   She also has a history of breast cancer, rheumatoid arthritis and presyncopal spells as detailed above.      RECOMMENDATIONS:  The diagnosis, prognosis and therapeutic options were reviewed with the patient and her daughter.  The brain MRI images were reviewed with them and the tumor was pointed out.  I do not recommend whole brain radiation therapy.  I do recommend gamma knife radiosurgery to the new brain metastasis.  The rationale for this approach as well as the procedures, risks, benefits and potential side effects were explained.  The patient and her daughter appear to understand and agree to proceed.  The procedure is scheduled for 10/10/2018 at the Gamma [facility] Center at the [facility].      Thank you very much for asking me to see this pleasant woman and to share in her evaluation.      Alex Perera MD      cc:   MD Itzel Dangelo MD D Ross Dickson, MD   [facility] Radiation Therapy   [address] [place] [address]      Tumor Registry             D: 2018   T: 2018   MT: nh      Name:     ANETTE SHAW   MRN:      [id number]        Account:      [id number]   :      1950           Service Date: 2018      Document: [id number]

## 2018-10-05 ENCOUNTER — TRANSFERRED RECORDS (OUTPATIENT)
Dept: HEALTH INFORMATION MANAGEMENT | Facility: CLINIC | Age: 68
End: 2018-10-05

## 2018-10-05 LAB
ANION GAP SERPL CALCULATED.3IONS-SCNC: NORMAL MMOL/L
BASOPHILS NFR BLD AUTO: NORMAL %
CHLORIDE SERPLBLD-SCNC: NORMAL MMOL/L
CO2 SERPL-SCNC: NORMAL MMOL/L
CREAT SERPL-MCNC: 1.05 MG/DL
EOSINOPHIL NFR BLD AUTO: NORMAL %
ERYTHROCYTE [DISTWIDTH] IN BLOOD BY AUTOMATED COUNT: NORMAL %
GFR SERPL CREATININE-BSD FRML MDRD: 54 ML/MIN/1.73M2
HCT VFR BLD AUTO: NORMAL %
HEMOGLOBIN: 13.7 G/DL (ref 11.7–15.7)
LYMPHOCYTES NFR BLD AUTO: NORMAL %
MCH RBC QN AUTO: NORMAL PG
MCHC RBC AUTO-ENTMCNC: NORMAL G/DL
MCV RBC AUTO: NORMAL FL
MONOCYTES NFR BLD AUTO: NORMAL %
NEUTROPHILS NFR BLD AUTO: 94.2 %
PLATELET COUNT - QUEST: 150 10^9/L (ref 150–450)
POTASSIUM SERPL-SCNC: 4.9 MMOL/L
RBC # BLD AUTO: NORMAL 10^12/L
SODIUM SERPL-SCNC: 138 MMOL/L
WBC # BLD AUTO: 11.2 10^9/L

## 2018-10-10 ENCOUNTER — OFFICE VISIT (OUTPATIENT)
Dept: RADIATION ONCOLOGY | Facility: CLINIC | Age: 68
End: 2018-10-10
Attending: RADIOLOGY
Payer: MEDICARE

## 2018-10-10 ENCOUNTER — HOSPITAL ENCOUNTER (OUTPATIENT)
Dept: MRI IMAGING | Facility: CLINIC | Age: 68
Discharge: HOME | End: 2018-10-10
Attending: NEUROLOGICAL SURGERY | Admitting: NEUROLOGICAL SURGERY
Payer: MEDICARE

## 2018-10-10 ENCOUNTER — HOSPITAL ENCOUNTER (OUTPATIENT)
Dept: MEDSURG UNIT | Facility: CLINIC | Age: 68
End: 2018-10-10
Attending: NEUROLOGICAL SURGERY
Payer: MEDICARE

## 2018-10-10 VITALS
DIASTOLIC BLOOD PRESSURE: 60 MMHG | RESPIRATION RATE: 16 BRPM | OXYGEN SATURATION: 97 % | SYSTOLIC BLOOD PRESSURE: 136 MMHG | HEART RATE: 88 BPM

## 2018-10-10 VITALS
TEMPERATURE: 97.9 F | RESPIRATION RATE: 18 BRPM | DIASTOLIC BLOOD PRESSURE: 70 MMHG | SYSTOLIC BLOOD PRESSURE: 142 MMHG | OXYGEN SATURATION: 94 %

## 2018-10-10 DIAGNOSIS — C79.31 METASTASIS TO BRAIN (H): ICD-10-CM

## 2018-10-10 DIAGNOSIS — C79.31 METASTASIS TO BRAIN (H): Primary | ICD-10-CM

## 2018-10-10 PROCEDURE — 77336 RADIATION PHYSICS CONSULT: CPT | Performed by: RADIOLOGY

## 2018-10-10 PROCEDURE — 77295 3-D RADIOTHERAPY PLAN: CPT | Performed by: RADIOLOGY

## 2018-10-10 PROCEDURE — 25000132 ZZH RX MED GY IP 250 OP 250 PS 637: Mod: ZF | Performed by: NEUROLOGICAL SURGERY

## 2018-10-10 PROCEDURE — A9585 GADOBUTROL INJECTION: HCPCS | Performed by: NEUROLOGICAL SURGERY

## 2018-10-10 PROCEDURE — 77300 RADIATION THERAPY DOSE PLAN: CPT | Performed by: RADIOLOGY

## 2018-10-10 PROCEDURE — 77334 RADIATION TREATMENT AID(S): CPT | Performed by: RADIOLOGY

## 2018-10-10 PROCEDURE — 77371 SRS MULTISOURCE: CPT | Performed by: RADIOLOGY

## 2018-10-10 PROCEDURE — 25000125 ZZHC RX 250: Mod: ZF | Performed by: NEUROLOGICAL SURGERY

## 2018-10-10 PROCEDURE — 40000172 ZZH STATISTIC PROCEDURE PREP ONLY

## 2018-10-10 PROCEDURE — 25000128 H RX IP 250 OP 636: Mod: ZF | Performed by: NEUROLOGICAL SURGERY

## 2018-10-10 PROCEDURE — A9270 NON-COVERED ITEM OR SERVICE: HCPCS | Mod: ZF | Performed by: NEUROLOGICAL SURGERY

## 2018-10-10 PROCEDURE — 77370 RADIATION PHYSICS CONSULT: CPT | Performed by: RADIOLOGY

## 2018-10-10 PROCEDURE — 70552 MRI BRAIN STEM W/DYE: CPT

## 2018-10-10 PROCEDURE — 25500064 ZZH RX 255 OP 636: Performed by: NEUROLOGICAL SURGERY

## 2018-10-10 RX ORDER — GADOBUTROL 604.72 MG/ML
7.5 INJECTION INTRAVENOUS ONCE
Status: COMPLETED | OUTPATIENT
Start: 2018-10-10 | End: 2018-10-10

## 2018-10-10 RX ORDER — LORAZEPAM 0.5 MG/1
.5-2 TABLET ORAL
Status: COMPLETED | OUTPATIENT
Start: 2018-10-10 | End: 2018-10-10

## 2018-10-10 RX ORDER — ONDANSETRON 4 MG/1
8 TABLET, ORALLY DISINTEGRATING ORAL EVERY 6 HOURS PRN
Status: DISCONTINUED | OUTPATIENT
Start: 2018-10-10 | End: 2018-10-11 | Stop reason: HOSPADM

## 2018-10-10 RX ORDER — LORAZEPAM 0.5 MG/1
.5-1 TABLET ORAL
Status: DISCONTINUED | OUTPATIENT
Start: 2018-10-10 | End: 2018-10-11 | Stop reason: HOSPADM

## 2018-10-10 RX ORDER — HEPARIN SODIUM (PORCINE) LOCK FLUSH IV SOLN 100 UNIT/ML 100 UNIT/ML
500 SOLUTION INTRAVENOUS SEE ADMIN INSTRUCTIONS
Status: DISCONTINUED | OUTPATIENT
Start: 2018-10-10 | End: 2018-10-11 | Stop reason: HOSPADM

## 2018-10-10 RX ORDER — HYDROCODONE BITARTRATE AND ACETAMINOPHEN 5; 325 MG/1; MG/1
1-2 TABLET ORAL EVERY 6 HOURS PRN
Status: DISCONTINUED | OUTPATIENT
Start: 2018-10-10 | End: 2018-10-11 | Stop reason: HOSPADM

## 2018-10-10 RX ORDER — HEPARIN SODIUM,PORCINE 10 UNIT/ML
5 VIAL (ML) INTRAVENOUS
Status: COMPLETED | OUTPATIENT
Start: 2018-10-10 | End: 2018-10-10

## 2018-10-10 RX ORDER — LIDOCAINE 40 MG/G
1 CREAM TOPICAL SEE ADMIN INSTRUCTIONS
Status: COMPLETED | OUTPATIENT
Start: 2018-10-10 | End: 2018-10-10

## 2018-10-10 RX ORDER — ONDANSETRON 2 MG/ML
4 INJECTION INTRAMUSCULAR; INTRAVENOUS
Status: DISCONTINUED | OUTPATIENT
Start: 2018-10-10 | End: 2018-10-11 | Stop reason: HOSPADM

## 2018-10-10 RX ORDER — ACETAMINOPHEN 325 MG/1
650 TABLET ORAL EVERY 4 HOURS PRN
Status: DISCONTINUED | OUTPATIENT
Start: 2018-10-10 | End: 2018-10-11 | Stop reason: HOSPADM

## 2018-10-10 RX ADMIN — Medication 5 ML: at 05:51

## 2018-10-10 RX ADMIN — LORAZEPAM 2 MG: 0.5 TABLET ORAL at 05:51

## 2018-10-10 RX ADMIN — Medication 5 ML: at 07:31

## 2018-10-10 RX ADMIN — GADOBUTROL 7.5 ML: 604.72 INJECTION INTRAVENOUS at 07:41

## 2018-10-10 RX ADMIN — BACITRACIN 30 ML: 5000 INJECTION, POWDER, FOR SOLUTION INTRAMUSCULAR at 05:50

## 2018-10-10 RX ADMIN — LIDOCAINE 1 G: 40 CREAM TOPICAL at 05:51

## 2018-10-10 NOTE — PROGRESS NOTES
GAMMA KNIFE RADIOSURGERY TREATMENT SUMMARY  DEPARTMENT OF RADIATION ONCOLOGY    Name: Sarah No                                        : 1950                 Medical Record #: [id number]                       Diagnosis:     Non Small Cell Lung Cancer and remote history of breast cancer               Date of Treatment: 10/10/2018                                       Referring Physicians:  Silvia Manning, Itzel Zuniga, LOLITA Brothers, Tumor Registry     BRIEF CLINICAL HISTORY:                                                                                                 The patient is a 68-year-old white female with a 40-pack-year smoking history who was in stable health until 2017 when she developed a cough.  Chest x-ray, CT and subsequent PET scan in 10/2017 confirmed extensive mediastinal adenopathy and bilateral supraclavicular and cervical adenopathy as well as bilateral pulmonary nodules.  Brain MRI 10/25/2017 showed no brain metastases.  Bronchoscopic biopsy, 2017, confirmed nonsmall cell lung cancer (poorly differentiated squamous cell carcinoma).  She received 3 cycles of chemotherapy which was poorly tolerated, but did produce a good response by CT scan, 2017.  Followup CT, 2018, showed a mixed response with some lesions in the lung, stable but others enlarging.  On 2018  chest CT showed a new left upper lung mass and enlargement of the other lung nodules.  In late 2018 she developed headaches, confusion and fatigue.  She was started on nivolumab immunotherapy on 2018 and that is ongoing.  Brain MRI, 06/15/2018, showed 4 brain metastases.  These were treated with gamma knife radiosurgery on 2018.  CT scan of the chest, abdomen and pelvis, 2018, showed progression of the disease in the lung.  She has been having presyncopal episodes for more than a year, but on 2018 she had a presyncopal episode associated with confusion.  Brain MRI,  08/21/2018, showed a 0.5 cm right posterior parietal paramedian metastasis which was new.  She was started on Keppra which made her somnolent, so the Keppra dose was reduced from 500 mg b.i.d. to daily and prednisone was increased from 10 mg daily to 30 mg daily.  She was referred for consideration of gamma knife radiosurgery for the new right posterior parietal paramedian brain metastasis.  Dr. Herrera repeated a brain MRI 09/25/2018 which showed slight growth of that lesion and an excellent response of the other lesions to the prior gamma knife.  The patient's chief complaint is fatigue and neck pain from hanging her head most of the day in addition to these intermittent presyncopal episodes.  She does report that her joint pain from rheumatoid arthritis is better on the increased dose of prednisone.   TECHNICAL SUMMARY        Collimators    Lesion Number Site Energy Minimum Tumor Dose (Gy) Isodose Line (%) Numbers Size (mm) Treatment Volume (cc)   1 Rt Post Parietal [place] 60 18 50 2 8 0.50     DESCRIPTION OF PROCEDURE:                                                                              On 10/10/2018 the patient was brought to the Gamma Knife suite at [facility].  After sedation and topical anesthetic, the head frame was put on by Dr. Prudence Samano.  The patient was then taken to the department of Radiology where a stereotactic brain MRI was performed.  The patient was admitted to the [facility] where she rested comfortably while treatment planning was completed.  The [employer] Gamma Plan software was used to create a highly conformal dose distribution using the number and size collimators detailed above.  The patient was brought to the Gamma Knife suite.  The treatment was delivered using the Model C [employer] Gamma Knife without complication.  The head frame was removed and the patient was discharged home in stable condition.  FOLLOW-UP PLANS:                                                                                                         The Gamma Knife Nurse Coordinator will call the patient tomorrow for short-term follow up.  She should have a brain MRI in 2 months and every 3 months thereafter.  The patient will follow-up with Dr. Manning regarding chemotherapy and taper of her prednisone dose.  I would be happy to see her anytime.    Alex Perera MD, [facility], [facility]

## 2018-10-10 NOTE — PROGRESS NOTES
PREOPERATIVE DIAGNOSIS: Metastatic non small cell lung cancer with brain metastases    POSTOPERATIVE DIAGNOSIS: same    OPERATIVE PROCEDURES:   1. Application of stereotactic head frame for stereotactic radiosurgery.   2. Gamma knife radiosurgery to single right posterior parietal metastasis.     SURGEON: José Samano MD.     ANESTHESIA: Local.     INDICATION FOR THE PROCEDURE: This patient has metastatic non small cell lung cancer with brain metastases. She underwent gamma knife radiosurgery to 4 metastases in July, with good tumor control. She now presents with a new enlargening right posterior parietal metastasis, and gamma knife radiosurgery was recommended to this lesion.    DESCRIPTION OF PROCEDURE: On the morning of the procedure, the patient was brought to the gamma knife radiosurgery area. A pre-procedure pause was performed to confirm the identity and date of birth of the patient, as well as the procedure site. The scalp was prepped with Betadine and then anesthetized with a combination of Marcaine, lidocaine  and epinephrine. The [employer] G-frame was applied and the pins were fixed to hand tightness. The patient tolerated the application of the frame well. A depth helmet was placed and pin and post measurements were taken.   The patient was taken to the MRI scanner where an MRI with gadolinium contrast was obtained. The patient returned from MRI and all measurements were checked to make sure that the frame had not moved. The lesion was outlined on the planning software and we made a conformal dose outline for this lesion. Dr. Perera selected the radiation dose for the lesion.  Measurements were taken,  steps performed and the patient  was then brought into the treatment room. Radiation was given according to the prescription above. The patient was taken out of the unit and out of the treatment room. The stereotactic frame was removed and a dressing was applied. After observation, the  patient was discharged. Followup arrangements will be made for the patient.     I attest that I was present for the entirety of the case, including pre-procedure assessment, stereotactic head frame placement, treatment planning, treatment delivery, as well as frame removal at the end of the treatment.      José Samano MD, PhD  Neurosurgery

## 2018-10-10 NOTE — LETTER
10/10/2018     RE: Sarah No  [id number] [facility]  [place] [address]     Dear Colleague,    Thank you for referring your patient, Sarah No, to the [facility], [place], RADIATION ONCOLOGY. Please see a copy of my visit note below.    GAMMA KNIFE RADIOSURGERY TREATMENT SUMMARY  DEPARTMENT OF RADIATION ONCOLOGY    Name: Sarah No                                        : 1950                 Medical Record #: [id number]                       Diagnosis:     Non Small Cell Lung Cancer and remote history of breast cancer               Date of Treatment: 10/10/2018                                       Referring Physicians:  Silvia Manning, LOLITA Jackson, Tumor Registry     BRIEF CLINICAL HISTORY:                                                                                                 The patient is a 68-year-old white female with a 40-pack-year smoking history who was in stable health until 2017 when she developed a cough.  Chest x-ray, CT and subsequent PET scan in 10/2017 confirmed extensive mediastinal adenopathy and bilateral supraclavicular and cervical adenopathy as well as bilateral pulmonary nodules.  Brain MRI 10/25/2017 showed no brain metastases.  Bronchoscopic biopsy, 2017, confirmed nonsmall cell lung cancer (poorly differentiated squamous cell carcinoma).  She received 3 cycles of chemotherapy which was poorly tolerated, but did produce a good response by CT scan, 2017.  Followup CT, 2018, showed a mixed response with some lesions in the lung, stable but others enlarging.  On 2018  chest CT showed a new left upper lung mass and enlargement of the other lung nodules.  In late 2018 she developed headaches, confusion and fatigue.  She was started on nivolumab immunotherapy on 2018 and that is ongoing.  Brain MRI, 06/15/2018, showed 4 brain metastases.  These were treated with gamma knife radiosurgery on 2018.  CT  scan of the chest, abdomen and pelvis, 08/14/2018, showed progression of the disease in the lung.  She has been having presyncopal episodes for more than a year, but on 08/21/2018 she had a presyncopal episode associated with confusion.  Brain MRI, 08/21/2018, showed a 0.5 cm right posterior parietal paramedian metastasis which was new.  She was started on Keppra which made her somnolent, so the Keppra dose was reduced from 500 mg b.i.d. to daily and prednisone was increased from 10 mg daily to 30 mg daily.  She was referred for consideration of gamma knife radiosurgery for the new right posterior parietal paramedian brain metastasis.  Dr. Herrera repeated a brain MRI 09/25/2018 which showed slight growth of that lesion and an excellent response of the other lesions to the prior gamma knife.  The patient's chief complaint is fatigue and neck pain from hanging her head most of the day in addition to these intermittent presyncopal episodes.  She does report that her joint pain from rheumatoid arthritis is better on the increased dose of prednisone.   TECHNICAL SUMMARY        Collimators    Lesion Number Site Energy Minimum Tumor Dose (Gy) Isodose Line (%) Numbers Size (mm) Treatment Volume (cc)   1 Rt Post Parietal [place] 60 18 50 2 8 0.50     DESCRIPTION OF PROCEDURE:                                                                              On 10/10/2018 the patient was brought to the Gamma Knife suite at [facility].  After sedation and topical anesthetic, the head frame was put on by Dr. Prudence Samano.  The patient was then taken to the department of Radiology where a stereotactic brain MRI was performed.  The patient was admitted to the [facility] where she rested comfortably while treatment planning was completed.  The [employer] Gamma Plan software was used to create a highly conformal dose distribution using the number and size collimators detailed above.  The patient was  brought to the Gamma Knife suite.  The treatment was delivered using the Model C Leksell Gamma Knife without complication.  The head frame was removed and the patient was discharged home in stable condition.  FOLLOW-UP PLANS:                                                                                                        The Gamma Knife Nurse Coordinator will call the patient tomorrow for short-term follow up.  She should have a brain MRI in 2 months and every 3 months thereafter.  The patient will follow-up with Dr. Manning regarding chemotherapy and taper of her prednisone dose.  I would be happy to see her anytime.    Alex Perera MD, [facility], [facility]      PREOPERATIVE DIAGNOSIS: Metastatic non small cell lung cancer with brain metastases    POSTOPERATIVE DIAGNOSIS: same    OPERATIVE PROCEDURES:   1. Application of stereotactic head frame for stereotactic radiosurgery.   2. Gamma knife radiosurgery to single right posterior parietal metastasis.     SURGEON: José Samano MD.     ANESTHESIA: Local.     INDICATION FOR THE PROCEDURE: This patient has metastatic non small cell lung cancer with brain metastases. She underwent gamma knife radiosurgery to 4 metastases in July, with good tumor control. She now presents with a new enlargening right posterior parietal metastasis, and gamma knife radiosurgery was recommended to this lesion.    DESCRIPTION OF PROCEDURE: On the morning of the procedure, the patient was brought to the gamma knife radiosurgery area. A pre-procedure pause was performed to confirm the identity and date of birth of the patient, as well as the procedure site. The scalp was prepped with Betadine and then anesthetized with a combination of Marcaine, lidocaine  and epinephrine. The Leksell G-frame was applied and the pins were fixed to hand tightness. The patient tolerated the application of the frame well. A depth helmet was placed and pin and post measurements were taken.   The patient was  taken to the MRI scanner where an MRI with gadolinium contrast was obtained. The patient returned from MRI and all measurements were checked to make sure that the frame had not moved. The lesion was outlined on the planning software and we made a conformal dose outline for this lesion. Dr. Perera selected the radiation dose for the lesion.  Measurements were taken,  steps performed and the patient  was then brought into the treatment room. Radiation was given according to the prescription above. The patient was taken out of the unit and out of the treatment room. The stereotactic frame was removed and a dressing was applied. After observation, the patient was discharged. Followup arrangements will be made for the patient.     I attest that I was present for the entirety of the case, including pre-procedure assessment, stereotactic head frame placement, treatment planning, treatment delivery, as well as frame removal at the end of the treatment.      José Samano MD, PhD  Neurosurgery    Again, thank you for allowing me to participate in the care of your patient.      Sincerely,    Alex Perera MD

## 2018-10-10 NOTE — MR AVS SNAPSHOT
After Visit Summary   10/10/2018    Sarah No    MRN: [id number]           Patient Information     Date Of Birth          1950        Visit Information        Provider Department      10/10/2018 5:30 AM Alex Perera MD [facility], [place], Radiation Oncology        Today's Diagnoses     Metastasis to brain (H)    -  1       Follow-ups after your visit        Future tests that were ordered for you today     Open Standing Orders        Priority Remaining Interval Expires Ordered    If Patient Diabetic: Glucose monitor nursing POCT Routine 18410/51511 PRN 10/11/2018 10/10/2018    Oxygen: Nasal cannula Routine 02121/02302 PRN  10/10/2018            Who to contact     Please call your clinic at [phone] to:    Ask questions about your health    Make or cancel appointments    Discuss your medicines    Learn about your test results    Speak to your doctor            Additional Information About Your Visit        MyChart Information     [employer] gives you secure access to your electronic health record. If you see a primary care provider, you can also send messages to your care team and make appointments. If you have questions, please call your primary care clinic.  If you do not have a primary care provider, please call [phone] and they will assist you.      [employer] is an electronic gateway that provides easy, online access to your medical records. With [employer], you can request a clinic appointment, read your test results, renew a prescription or communicate with your care team.     To access your existing account, please contact your [facility] or call [phone] for assistance.        Care EveryWhere ID     This is your Care EveryWhere ID. This could be used by other organizations to access your [place] medical records  [id number]        Your Vitals Were     Pulse Respirations Pulse Oximetry             88 16 97%          Blood Pressure from Last 3  Encounters:   10/10/18 142/70   10/10/18 136/60   09/26/18 (!) 111/39    Weight from Last 3 Encounters:   09/26/18 72.6 kg (160 lb)   07/18/18 72.6 kg (160 lb)   06/27/18 75.3 kg (166 lb)              Today, you had the following     No orders found for display       Primary Care Provider Fax #    Physician No Ref-Primary [phone]       No address on file        Equal Access to Services     CANELO JOHNSON : Hadii aad ku hadasho Soomaali, waaxda luqadaha, qaybta kaalmada adeegyada, waxay alisain hayfabiann michelle godinezjesusgray atkinson . So [facility] [phone].    ATENCIÓN: Si habla español, tiene a león disposición servicios gratuitos de asistencia lingüística. Ll[facility] [phone].    We comply with applicable federal civil rights laws and [place] laws. We do not discriminate on the basis of race, color, national origin, age, disability, sex, sexual orientation, or gender identity.            Thank you!     Thank you for choosing [facility], [place], RADIATION ONCOLOGY  for your care. Our goal is always to provide you with excellent care. Hearing back from our patients is one way we can continue to improve our services. Please take a few minutes to complete the written survey that you may receive in the mail after your visit with us. Thank you!             Your Updated Medication List - Protect others around you: Learn how to safely use, store and throw away your medicines at www.disposemymeds.org.          This list is accurate as of 10/10/18 11:59 PM.  Always use your most recent med list.                   Brand Name Dispense Instructions for use Diagnosis    CYCLOBENZAPRINE HCL PO      Take 5 mg by mouth 2 times daily        diphenoxylate-atropine 2.5-0.025 MG per tablet    LOMOTIL     Take 1 tablet by mouth 4 times daily as needed for diarrhea        FOLIC ACID PO      Take 1 mg by mouth daily        GABAPENTIN PO      Take 800 mg by mouth 2 times daily        HYDROXYZINE HCL PO      Take 25 mg by mouth every 6 hours as needed  for itching        KEPPRA PO      Take 500 mg by mouth At Bedtime        LISINOPRIL PO      Take 5 mg by mouth daily        LORAZEPAM PO      Take 0.5 mg by mouth every 4 hours as needed for anxiety        METOPROLOL TARTRATE PO      Take 12.5 mg by mouth 2 times daily        mometasone-formoterol 100-5 MCG/ACT oral inhaler    DULERA     Inhale 2 puffs into the lungs 2 times daily        PILOCARPINE HCL PO      Take 5 mg by mouth 3 times daily        PREDNISONE PO      Take 30 mg by mouth daily        PRILOSEC OTC PO           TRAMADOL HCL PO      Take 50 mg by mouth every 6 hours as needed for moderate to severe pain        ZOFRAN PO      Take 8 mg by mouth every 6 hours as needed for nausea or vomiting

## 2018-10-10 NOTE — PROGRESS NOTES
Arrived to 2A from MRI to await gamma knife procedure.  Denies pain.  Family with her.  Head gear intact with pin sites C/D/I.  Offered po food & fluids.

## 2018-10-11 ENCOUNTER — TELEPHONE (OUTPATIENT)
Dept: RADIATION ONCOLOGY | Facility: CLINIC | Age: 68
End: 2018-10-11

## 2018-10-11 NOTE — TELEPHONE ENCOUNTER
A call was placed to Sarah in follow up to her Gamma Knife treatment on 10/10/18.  Sarah said she has a headache off and on and takes tylenol and that helps.  Sarah said she is eating with no nausea, the pin sites look good and she said she thought she was doing fine.

## 2018-12-20 ENCOUNTER — TRANSFERRED RECORDS (OUTPATIENT)
Dept: HEALTH INFORMATION MANAGEMENT | Facility: CLINIC | Age: 68
End: 2018-12-20

## 2018-12-31 ENCOUNTER — TRANSFERRED RECORDS (OUTPATIENT)
Dept: HEALTH INFORMATION MANAGEMENT | Facility: CLINIC | Age: 68
End: 2018-12-31

## 2018-12-31 DIAGNOSIS — C79.31 METASTASIS TO BRAIN (H): Primary | ICD-10-CM

## 2018-12-31 LAB
ANION GAP SERPL CALCULATED.3IONS-SCNC: NORMAL MMOL/L
BASOPHILS NFR BLD AUTO: NORMAL %
BUN SERPL-MCNC: NORMAL MG/DL
CALCIUM SERPL-MCNC: NORMAL MG/DL
CHLORIDE SERPLBLD-SCNC: NORMAL MMOL/L
CO2 SERPL-SCNC: NORMAL MMOL/L
CREAT SERPL-MCNC: 1.03 MG/DL
EOSINOPHIL NFR BLD AUTO: NORMAL %
ERYTHROCYTE [DISTWIDTH] IN BLOOD BY AUTOMATED COUNT: NORMAL %
GFR SERPL CREATININE-BSD FRML MDRD: 55 ML/MIN/1.73M2
GLUCOSE SERPL-MCNC: NORMAL MG/DL (ref 70–99)
HCT VFR BLD AUTO: NORMAL %
HEMOGLOBIN: 13.6 G/DL (ref 11.7–15.7)
LYMPHOCYTES NFR BLD AUTO: NORMAL %
MCH RBC QN AUTO: NORMAL PG
MCHC RBC AUTO-ENTMCNC: NORMAL G/DL
MCV RBC AUTO: NORMAL FL
MONOCYTES NFR BLD AUTO: NORMAL %
NEUTROPHILS NFR BLD AUTO: NORMAL %
PLATELET COUNT - QUEST: 179 10^9/L (ref 150–450)
POTASSIUM SERPL-SCNC: 4.8 MMOL/L
RBC # BLD AUTO: NORMAL 10^12/L
SODIUM SERPL-SCNC: 139 MMOL/L
WBC # BLD AUTO: 11.6 10^9/L

## 2019-01-09 ENCOUNTER — HOSPITAL ENCOUNTER (OUTPATIENT)
Dept: MEDSURG UNIT | Facility: CLINIC | Age: 69
End: 2019-01-09
Attending: RADIOLOGY
Payer: COMMERCIAL

## 2019-01-09 ENCOUNTER — HOSPITAL ENCOUNTER (OUTPATIENT)
Dept: MRI IMAGING | Facility: CLINIC | Age: 69
Discharge: HOME | End: 2019-01-09
Attending: NEUROLOGICAL SURGERY | Admitting: NEUROLOGICAL SURGERY
Payer: COMMERCIAL

## 2019-01-09 ENCOUNTER — OFFICE VISIT (OUTPATIENT)
Dept: RADIATION ONCOLOGY | Facility: CLINIC | Age: 69
End: 2019-01-09
Attending: RADIOLOGY
Payer: COMMERCIAL

## 2019-01-09 VITALS
SYSTOLIC BLOOD PRESSURE: 132 MMHG | DIASTOLIC BLOOD PRESSURE: 51 MMHG | RESPIRATION RATE: 16 BRPM | OXYGEN SATURATION: 94 % | HEART RATE: 92 BPM

## 2019-01-09 DIAGNOSIS — C79.31 METASTASIS TO BRAIN (H): ICD-10-CM

## 2019-01-09 DIAGNOSIS — C79.31 METASTASIS TO BRAIN (H): Primary | ICD-10-CM

## 2019-01-09 PROCEDURE — 25000131 ZZH RX MED GY IP 250 OP 636 PS 637: Mod: ZF | Performed by: NEUROLOGICAL SURGERY

## 2019-01-09 PROCEDURE — 25000128 H RX IP 250 OP 636: Mod: ZF | Performed by: NEUROLOGICAL SURGERY

## 2019-01-09 PROCEDURE — 25000132 ZZH RX MED GY IP 250 OP 250 PS 637: Performed by: RADIOLOGY

## 2019-01-09 PROCEDURE — 40000172 ZZH STATISTIC PROCEDURE PREP ONLY

## 2019-01-09 PROCEDURE — 25000132 ZZH RX MED GY IP 250 OP 250 PS 637: Mod: ZF | Performed by: NEUROLOGICAL SURGERY

## 2019-01-09 PROCEDURE — 77334 RADIATION TREATMENT AID(S): CPT | Performed by: RADIOLOGY

## 2019-01-09 PROCEDURE — A9585 GADOBUTROL INJECTION: HCPCS | Performed by: NEUROLOGICAL SURGERY

## 2019-01-09 PROCEDURE — 70552 MRI BRAIN STEM W/DYE: CPT

## 2019-01-09 PROCEDURE — 25500064 ZZH RX 255 OP 636: Performed by: NEUROLOGICAL SURGERY

## 2019-01-09 PROCEDURE — 77370 RADIATION PHYSICS CONSULT: CPT | Performed by: RADIOLOGY

## 2019-01-09 PROCEDURE — 77300 RADIATION THERAPY DOSE PLAN: CPT | Performed by: RADIOLOGY

## 2019-01-09 PROCEDURE — 25000125 ZZHC RX 250: Mod: ZF | Performed by: NEUROLOGICAL SURGERY

## 2019-01-09 PROCEDURE — 77371 SRS MULTISOURCE: CPT | Performed by: RADIOLOGY

## 2019-01-09 PROCEDURE — 77295 3-D RADIOTHERAPY PLAN: CPT | Performed by: RADIOLOGY

## 2019-01-09 RX ORDER — ONDANSETRON 2 MG/ML
4 INJECTION INTRAMUSCULAR; INTRAVENOUS
Status: DISCONTINUED | OUTPATIENT
Start: 2019-01-09 | End: 2019-01-09 | Stop reason: HOSPADM

## 2019-01-09 RX ORDER — DEXAMETHASONE 4 MG/1
4 TABLET ORAL
Status: COMPLETED | OUTPATIENT
Start: 2019-01-09 | End: 2019-01-09

## 2019-01-09 RX ORDER — ACETAMINOPHEN 325 MG/1
650 TABLET ORAL EVERY 4 HOURS PRN
Status: DISCONTINUED | OUTPATIENT
Start: 2019-01-09 | End: 2019-01-09 | Stop reason: HOSPADM

## 2019-01-09 RX ORDER — PANTOPRAZOLE SODIUM 40 MG/1
40 TABLET, DELAYED RELEASE ORAL
COMMUNITY

## 2019-01-09 RX ORDER — NIZATIDINE 300 MG
300 CAPSULE ORAL AT BEDTIME
COMMUNITY

## 2019-01-09 RX ORDER — DULOXETIN HYDROCHLORIDE 60 MG/1
60 CAPSULE, DELAYED RELEASE ORAL 2 TIMES DAILY
COMMUNITY

## 2019-01-09 RX ORDER — LORAZEPAM 1 MG/1
1 TABLET ORAL EVERY 4 HOURS PRN
Status: DISCONTINUED | OUTPATIENT
Start: 2019-01-09 | End: 2019-01-10 | Stop reason: HOSPADM

## 2019-01-09 RX ORDER — LIDOCAINE 40 MG/G
1 CREAM TOPICAL SEE ADMIN INSTRUCTIONS
Status: COMPLETED | OUTPATIENT
Start: 2019-01-09 | End: 2019-01-09

## 2019-01-09 RX ORDER — PREDNISONE 5 MG/1
5 TABLET ORAL 2 TIMES DAILY
Refills: 1 | COMMUNITY
Start: 2018-11-21

## 2019-01-09 RX ORDER — GADOBUTROL 604.72 MG/ML
7.5 INJECTION INTRAVENOUS ONCE
Status: COMPLETED | OUTPATIENT
Start: 2019-01-09 | End: 2019-01-09

## 2019-01-09 RX ORDER — TRAMADOL HYDROCHLORIDE 50 MG/1
50 TABLET ORAL EVERY 6 HOURS PRN
Refills: 0 | COMMUNITY
Start: 2019-01-05

## 2019-01-09 RX ORDER — METOPROLOL TARTRATE 25 MG/1
12.5 TABLET, FILM COATED ORAL 2 TIMES DAILY
Refills: 3 | COMMUNITY
Start: 2018-11-21

## 2019-01-09 RX ORDER — ONDANSETRON 4 MG/1
8 TABLET, ORALLY DISINTEGRATING ORAL EVERY 6 HOURS PRN
Status: DISCONTINUED | OUTPATIENT
Start: 2019-01-09 | End: 2019-01-09 | Stop reason: HOSPADM

## 2019-01-09 RX ORDER — LISINOPRIL 2.5 MG/1
TABLET ORAL
Refills: 0 | COMMUNITY
Start: 2019-01-03

## 2019-01-09 RX ORDER — HEPARIN SODIUM (PORCINE) LOCK FLUSH IV SOLN 100 UNIT/ML 100 UNIT/ML
500 SOLUTION INTRAVENOUS SEE ADMIN INSTRUCTIONS
Status: DISCONTINUED | OUTPATIENT
Start: 2019-01-09 | End: 2019-01-09 | Stop reason: HOSPADM

## 2019-01-09 RX ORDER — HEPARIN SODIUM,PORCINE 10 UNIT/ML
5 VIAL (ML) INTRAVENOUS
Status: COMPLETED | OUTPATIENT
Start: 2019-01-09 | End: 2019-01-09

## 2019-01-09 RX ADMIN — Medication 5 ML: at 08:23

## 2019-01-09 RX ADMIN — LIDOCAINE 1 G: 40 CREAM TOPICAL at 06:37

## 2019-01-09 RX ADMIN — DEXAMETHASONE 4 MG: 4 TABLET ORAL at 15:56

## 2019-01-09 RX ADMIN — LORAZEPAM 1 MG: 0.5 TABLET ORAL at 14:29

## 2019-01-09 RX ADMIN — Medication 5 ML: at 06:38

## 2019-01-09 RX ADMIN — ACETAMINOPHEN 650 MG: 325 TABLET, FILM COATED ORAL at 12:36

## 2019-01-09 RX ADMIN — ONDANSETRON 8 MG: 4 TABLET, ORALLY DISINTEGRATING ORAL at 06:38

## 2019-01-09 RX ADMIN — SODIUM CHLORIDE, PRESERVATIVE FREE 500 UNITS: 5 INJECTION INTRAVENOUS at 20:09

## 2019-01-09 RX ADMIN — BUPIVACAINE HYDROCHLORIDE 30 ML: 7.5 INJECTION, SOLUTION EPIDURAL; RETROBULBAR at 06:37

## 2019-01-09 RX ADMIN — GADOBUTROL 7.5 ML: 604.72 INJECTION INTRAVENOUS at 08:59

## 2019-01-09 NOTE — PROGRESS NOTES
GAMMA KNIFE RADIOSURGERY TREATMENT SUMMARY  DEPARTMENT OF RADIATION ONCOLOGY    Name: Sarah No                                        : 1950                 Medical Record #: [id number]                       Diagnosis:  Non Small Cell Lung Cancer                                  Date of Treatment: 2019                                       Referring Physicians:  Silvia Manning, Itzel Zuniga, Clayton Brothers, Tumor Registry     BRIEF CLINICAL HISTORY:                                                                                                 The patient is a 68-year-old white female with a 40-pack-year smoking history who was in stable health until 2017 when she developed a cough.  Chest x-ray, CT and subsequent PET scan in 10/2017 confirmed extensive mediastinal adenopathy and bilateral supraclavicular and cervical adenopathy as well as bilateral pulmonary nodules.  Brain MRI 10/25/2017 showed no brain metastases.  Bronchoscopic biopsy, 2017, confirmed nonsmall cell lung cancer (poorly differentiated squamous cell carcinoma).  She received 3 cycles of chemotherapy which was poorly tolerated, but did produce a good response by CT scan, 2017.  Followup CT, 2018, showed a mixed response with some lesions in the lung, stable but others enlarging.  On 2018  chest CT showed a new left upper lung mass and enlargement of the other lung nodules.  In late 2018 she developed headaches, confusion and fatigue.  She was started on nivolumab immunotherapy on 2018 and that is ongoing.  Brain MRI, 06/15/2018, showed 4 brain metastases.  These were treated with gamma knife radiosurgery on 2018.  CT scan of the chest, abdomen and pelvis, 2018, showed progression of the disease in the lung.  She has been having presyncopal episodes for more than a year, but on 2018 she had a presyncopal episode associated with confusion.  Brain MRI, 2018, showed a 0.5 cm  right posterior parietal paramedian metastasis which was new.  She was started on Keppra which made her somnolent, so the Keppra dose was reduced from 500 mg b.i.d. to daily and prednisone was increased from 10 mg daily to 30 mg daily.  She was referred for consideration of gamma knife radiosurgery for the new right posterior parietal paramedian brain metastasis.  Dr. Manning repeated a brain MRI 09/25/2018 which showed slight growth of that lesion and an excellent response of the other lesions to the prior gamma knife.  On 10/10/18, she underwent GK#2 to the right posterior parietal lesion.  Follow-up brain MRI 12/20/18 showed 4 new lesions. After multi-disciplinary consultations, she chose to proceed with GK #3.  Unfortunately, today's MRI with 1mm slice thickness showed several more punctate lesions.  The 8 largest lesions were treated as detailed below.  TECHNICAL SUMMARY        Collimators    Lesion Number Site Energy Minimum Tumor Dose (Gy) Isodose Line (%) Numbers Size (mm) Treatment Volume (cc)   1 Rt Cingul Parietal [place] 60 18 50 2 4 0.12   2 Rt Inf Frontal [place] 60 18 80 1 8 0.30   3 Mid  Vermis [place] 60 18 45 1 4 0.12   4 Rt Lat Cerebellm [place] 60 18 50 2 8 0.63   5 Rt Med Cerebellm [place] 60 18 90 1 8 0.28   6 Rt Post Cerebellm [place] 60 18 80 1 8 0.35   7 Rt Ant Cerebellm [place] 60 18 50 1 4 0.23   8 Rt Ant Cereblm-2 [place] 60 18 50 1 4 0.23     DESCRIPTION OF PROCEDURE:                                                                              On 1/9/2019 the patient was brought to the Gamma Knife suite at [facility].  After sedation and topical anesthetic, the head frame was put on by Dr. Aguilar Blackburn.  The patient was then taken to the department of Radiology where a stereotactic brain MRI was performed.  The patient was admitted to the [facility] where she rested comfortably while treatment planning was completed.  The [employer] Gamma Plan software was  used to create a highly conformal dose distribution using the number and size collimators detailed above.  The patient was brought to the Gamma Knife suite.  The treatment was delivered using the Model C Leksell Gamma Knife without complication.  The head frame was removed and the patient was discharged home in stable condition.  FOLLOW-UP PLANS:                                                                                                        The Gamma Knife Nurse Coordinator will call the patient tomorrow for short-term follow up.  I had a long talk with the patient and her family about treatment options which would include hospice vs whole brain radiation therapy. She should have a brain MRI in 2 months and every 3 months thereafter.  The patient will also follow-up with Dr. Manning on 1/16/19 to further discuss these options.  I would be happy to see her anytime.    Alex Perera MD, [place], [place]

## 2019-01-09 NOTE — LETTER
2019       RE: Sarah No  [address] [address] [facility] [address]  [place] [address]     Dear Colleague,    Thank you for referring your patient, Sarah No, to the [facility], [place], RADIATION ONCOLOGY. Please see a copy of my visit note below.    GAMMA KNIFE RADIOSURGERY TREATMENT SUMMARY  DEPARTMENT OF RADIATION ONCOLOGY    Name: Sarah No                                        : 1950                 Medical Record #: [id number]                       Diagnosis:  Non Small Cell Lung Cancer                                  Date of Treatment: 2019                                       Referring Physicians:  Silvia Manning, Itzel Zuniga, Clayton Brothers, Tumor Registry     BRIEF CLINICAL HISTORY:                                                                                                 The patient is a 68-year-old white female with a 40-pack-year smoking history who was in stable health until 2017 when she developed a cough.  Chest x-ray, CT and subsequent PET scan in 10/2017 confirmed extensive mediastinal adenopathy and bilateral supraclavicular and cervical adenopathy as well as bilateral pulmonary nodules.  Brain MRI 10/25/2017 showed no brain metastases.  Bronchoscopic biopsy, 2017, confirmed nonsmall cell lung cancer (poorly differentiated squamous cell carcinoma).  She received 3 cycles of chemotherapy which was poorly tolerated, but did produce a good response by CT scan, 2017.  Followup CT, 2018, showed a mixed response with some lesions in the lung, stable but others enlarging.  On 2018  chest CT showed a new left upper lung mass and enlargement of the other lung nodules.  In late 2018 she developed headaches, confusion and fatigue.  She was started on nivolumab immunotherapy on 2018 and that is ongoing.  Brain MRI, 06/15/2018, showed 4 brain metastases.  These were treated with gamma knife radiosurgery on 2018.  CT scan of the chest, abdomen  and pelvis, 08/14/2018, showed progression of the disease in the lung.  She has been having presyncopal episodes for more than a year, but on 08/21/2018 she had a presyncopal episode associated with confusion.  Brain MRI, 08/21/2018, showed a 0.5 cm right posterior parietal paramedian metastasis which was new.  She was started on Keppra which made her somnolent, so the Keppra dose was reduced from 500 mg b.i.d. to daily and prednisone was increased from 10 mg daily to 30 mg daily.  She was referred for consideration of gamma knife radiosurgery for the new right posterior parietal paramedian brain metastasis.  Dr. Manning repeated a brain MRI 09/25/2018 which showed slight growth of that lesion and an excellent response of the other lesions to the prior gamma knife.  On 10/10/18, she underwent GK#2 to the right posterior parietal lesion.  Follow-up brain MRI 12/20/18 showed 4 new lesions. After multi-disciplinary consultations, she chose to proceed with GK #3.  Unfortunately, today's MRI with 1mm slice thickness showed several more punctate lesions.  The 8 largest lesions were treated as detailed below.  TECHNICAL SUMMARY        Collimators    Lesion Number Site Energy Minimum Tumor Dose (Gy) Isodose Line (%) Numbers Size (mm) Treatment Volume (cc)   1 Rt Cingul Parietal [place] 60 18 50 2 4 0.12   2 Rt Inf Frontal [place] 60 18 80 1 8 0.30   3 Mid  Vermis [place] 60 18 45 1 4 0.12   4 Rt Lat Cerebellm [place] 60 18 50 2 8 0.63   5 Rt Med Cerebellm [place] 60 18 90 1 8 0.28   6 Rt Post Cerebellm [place] 60 18 80 1 8 0.35   7 Rt Ant Cerebellm [place] 60 18 50 1 4 0.23   8 Rt Ant Cereblm-2 [place] 60 18 50 1 4 0.23     DESCRIPTION OF PROCEDURE:                                                                              On 1/9/2019 the patient was brought to the Gamma Knife suite at [facility].  After sedation and topical anesthetic, the head frame was put on by Dr. Aguilar Blackburn.  The patient was  then taken to the department of Radiology where a stereotactic brain MRI was performed.  The patient was admitted to the [facility] where she rested comfortably while treatment planning was completed.  The [employer]ell Gamma Plan software was used to create a highly conformal dose distribution using the number and size collimators detailed above.  The patient was brought to the Gamma Knife suite.  The treatment was delivered using the Model C Leksell Gamma Knife without complication.  The head frame was removed and the patient was discharged home in stable condition.  FOLLOW-UP PLANS:                                                                                                        The Gamma Knife Nurse Coordinator will call the patient tomorrow for short-term follow up.  I had a long talk with the patient and her family about treatment options which would include hospice vs whole brain radiation therapy. She should have a brain MRI in 2 months and every 3 months thereafter.  The patient will also follow-up with Dr. Manning on 1/16/19 to further discuss these options.  I would be happy to see her anytime.    Alex Perera MD, [facility], [place]

## 2019-01-09 NOTE — PROCEDURES
PREOPERATIVE DIAGNOSIS:  Non-small cell lung cancer    POSTOPERATIVE DIAGNOSIS:  Same    OPERATIVE PROCEDURES:  1.  Gamma Knife radiosurgery to 8 metastatic lesions  2.  Application of stereotactic head frame for stereotactic radiosurgery    SURGEON:  Aguilar Blackburn MD    ASSISTANT:  None    ANESTHESIA:  Local    INDICATIONS FOR THE PROCEDURE:  This patient has metastatic non-small cell lung cancer with brain metastases.  She has undergone Gamma Knife treatment to 4 metastases on 7/18/2018 and to a single metastasis on 10/10/2018.  These have all responded to treatment.  Recent imaging has shown 5 new metastatic lesions.  On the day of treatment, imaging revealed a total of 17 new metastases.  Gamma Knife stereotactic radiosurgery was recommended to treat the largest 8 lesions.    DESCRIPTION OF THE PROCEDURE:  On the morning of the procedure, the patient was brought to the Gamma Knife radiosurgery area.  A pre-procedure pause was performed to confirm the identity and date of birth of the patient, as well as the procedure site.  The scalp was prepped with betadine and then anesthetized with a combination of marcaine, lidocaine, and epinephrine.  The [place] G-frame was applied and the pins were fixed to hand tightness.  The patient tolerated the application of the frame well.  A depth helmet was placed and pin and post measurements were taken.    The patient was taken to the MRI scanner where an MRI with gadolinium contrast was obtained.  The patient returned from MRI and all measurements were checked to make sure that the frame had not moved.  The lesions were outlined on the planning software and we made a conformal dose outline for each of these lesions.  Dr. Perera selected the radiation dose for each lesion.  Measurements were taken,  steps performed, and the patient was then brought into the treatment room.  Radiation was given according to the prescription.    The patient was then taken  out of the unit and out of the treatment room.  The stereotactic frame was removed and a dressing was applied.  After observation, the patient was discharged.  Follow up arrangements will be made for the patient.    I attest that I was present for the entirety of the case, including pre-procedure assessment, stereotactic head frame placement, treatment planning, treatment delivery, as well as frame removal at the end of the treatment.

## 2020-03-02 ENCOUNTER — HEALTH MAINTENANCE LETTER (OUTPATIENT)
Age: 70
End: 2020-03-02

## 2020-12-14 ENCOUNTER — HEALTH MAINTENANCE LETTER (OUTPATIENT)
Age: 70
End: 2020-12-14

## 2021-04-18 ENCOUNTER — HEALTH MAINTENANCE LETTER (OUTPATIENT)
Age: 71
End: 2021-04-18

## 2021-10-02 ENCOUNTER — HEALTH MAINTENANCE LETTER (OUTPATIENT)
Age: 71
End: 2021-10-02

## 2022-05-14 ENCOUNTER — HEALTH MAINTENANCE LETTER (OUTPATIENT)
Age: 72
End: 2022-05-14

## 2022-09-03 ENCOUNTER — HEALTH MAINTENANCE LETTER (OUTPATIENT)
Age: 72
End: 2022-09-03

## 2023-06-03 ENCOUNTER — HEALTH MAINTENANCE LETTER (OUTPATIENT)
Age: 73
End: 2023-06-03

## (undated) RX ORDER — HEPARIN SODIUM (PORCINE) LOCK FLUSH IV SOLN 100 UNIT/ML 100 UNIT/ML
SOLUTION INTRAVENOUS
Status: DISPENSED
Start: 2018-07-18

## (undated) RX ORDER — HEPARIN SODIUM (PORCINE) LOCK FLUSH IV SOLN 100 UNIT/ML 100 UNIT/ML
SOLUTION INTRAVENOUS
Status: DISPENSED
Start: 2019-01-09

## (undated) RX ORDER — HEPARIN SODIUM,PORCINE 10 UNIT/ML
VIAL (ML) INTRAVENOUS
Status: DISPENSED
Start: 2019-01-09

## (undated) RX ORDER — HEPARIN SODIUM (PORCINE) LOCK FLUSH IV SOLN 100 UNIT/ML 100 UNIT/ML
SOLUTION INTRAVENOUS
Status: DISPENSED
Start: 2018-10-10

## (undated) RX ORDER — LORAZEPAM 0.5 MG/1
TABLET ORAL
Status: DISPENSED
Start: 2018-07-18

## (undated) RX ORDER — DEXAMETHASONE 4 MG/1
TABLET ORAL
Status: DISPENSED
Start: 2019-01-09

## (undated) RX ORDER — LORAZEPAM 0.5 MG/1
TABLET ORAL
Status: DISPENSED
Start: 2019-01-09

## (undated) RX ORDER — HEPARIN SODIUM,PORCINE 10 UNIT/ML
VIAL (ML) INTRAVENOUS
Status: DISPENSED
Start: 2018-10-10

## (undated) RX ORDER — ACETAMINOPHEN 325 MG/1
TABLET ORAL
Status: DISPENSED
Start: 2019-01-09

## (undated) RX ORDER — HEPARIN SODIUM,PORCINE 10 UNIT/ML
VIAL (ML) INTRAVENOUS
Status: DISPENSED
Start: 2018-07-18

## (undated) RX ORDER — ONDANSETRON 4 MG/1
TABLET, ORALLY DISINTEGRATING ORAL
Status: DISPENSED
Start: 2019-01-09

## (undated) RX ORDER — LORAZEPAM 0.5 MG/1
TABLET ORAL
Status: DISPENSED
Start: 2018-10-10

## (undated) RX ORDER — LIDOCAINE 40 MG/G
CREAM TOPICAL
Status: DISPENSED
Start: 2018-10-10

## (undated) RX ORDER — LIDOCAINE 40 MG/G
CREAM TOPICAL
Status: DISPENSED
Start: 2018-07-18

## (undated) RX ORDER — LIDOCAINE 40 MG/G
CREAM TOPICAL
Status: DISPENSED
Start: 2019-01-09